# Patient Record
Sex: MALE | Race: BLACK OR AFRICAN AMERICAN | Employment: FULL TIME | URBAN - METROPOLITAN AREA
[De-identification: names, ages, dates, MRNs, and addresses within clinical notes are randomized per-mention and may not be internally consistent; named-entity substitution may affect disease eponyms.]

---

## 2020-02-25 ENCOUNTER — OFFICE VISIT (OUTPATIENT)
Dept: FAMILY MEDICINE | Facility: CLINIC | Age: 50
End: 2020-02-25
Payer: COMMERCIAL

## 2020-02-25 VITALS
TEMPERATURE: 98.1 F | HEIGHT: 67 IN | BODY MASS INDEX: 26.84 KG/M2 | DIASTOLIC BLOOD PRESSURE: 99 MMHG | HEART RATE: 97 BPM | SYSTOLIC BLOOD PRESSURE: 190 MMHG | WEIGHT: 171 LBS | OXYGEN SATURATION: 100 %

## 2020-02-25 DIAGNOSIS — Z13.1 SCREENING FOR DIABETES MELLITUS: ICD-10-CM

## 2020-02-25 DIAGNOSIS — G89.29 CHRONIC LEFT SHOULDER PAIN: ICD-10-CM

## 2020-02-25 DIAGNOSIS — R03.0 ELEVATED BLOOD PRESSURE READING WITHOUT DIAGNOSIS OF HYPERTENSION: Primary | ICD-10-CM

## 2020-02-25 DIAGNOSIS — Z13.220 SCREENING FOR HYPERLIPIDEMIA: ICD-10-CM

## 2020-02-25 DIAGNOSIS — M25.512 CHRONIC LEFT SHOULDER PAIN: ICD-10-CM

## 2020-02-25 DIAGNOSIS — I10 ESSENTIAL HYPERTENSION WITH GOAL BLOOD PRESSURE LESS THAN 130/80: ICD-10-CM

## 2020-02-25 PROCEDURE — 93000 ELECTROCARDIOGRAM COMPLETE: CPT | Performed by: FAMILY MEDICINE

## 2020-02-25 PROCEDURE — 99214 OFFICE O/P EST MOD 30 MIN: CPT | Performed by: FAMILY MEDICINE

## 2020-02-25 RX ORDER — HYDROCHLOROTHIAZIDE 25 MG/1
25 TABLET ORAL DAILY
Qty: 30 TABLET | Refills: 0 | Status: SHIPPED | OUTPATIENT
Start: 2020-02-25 | End: 2020-03-03

## 2020-02-25 ASSESSMENT — MIFFLIN-ST. JEOR: SCORE: 1601.34

## 2020-02-25 NOTE — PROGRESS NOTES
"Subjective     David Alaniz is a 49 year old male who presents to clinic today for the following health issues:    HPI   Joint Pain    Onset: Sept or Oct    Description:   Location: left shoulder  Character: Sharp and throbbing    Intensity: moderate    Progression of Symptoms: worse    Accompanying Signs & Symptoms:  Other symptoms: none    History:   Previous similar pain: no       Precipitating factors:   Trauma or overuse: YES- started after getting flu shot    Alleviating factors:  Improved by: nothing    Therapies Tried and outcome: tylenol    Denies any numbness, tingling, or weakness in extremities.    Hypertension: Of note, patient's blood pressure was 190/99 today in clinic.  He denies any headaches, lightheadedness, or dizziness.  No chest pain, dyspnea, shortness of breath.  He has noticed change in vision and has trouble with reading things close up now.  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  Typically states that his in the 140 systolic and between 80 and 100 diastolic.  Occasionally he will check it and it is in the 190s.        There is no problem list on file for this patient.    No past surgical history on file.    Social History     Tobacco Use     Smoking status: Never Smoker     Smokeless tobacco: Never Used   Substance Use Topics     Alcohol use: Yes     Comment: rarely     Family History   Problem Relation Age of Onset     Cerebrovascular Disease Mother          Reviewed and updated as needed this visit by Provider  Problems  Fam Hx         Review of Systems   ROS COMP: Constitutional, HEENT, cardiovascular, pulmonary, gi and gu systems are negative, except as otherwise noted.      Objective    BP (!) 190/99   Pulse 97   Temp 98.1  F (36.7  C) (Oral)   Ht 1.689 m (5' 6.5\")   Wt 77.6 kg (171 lb)   SpO2 100%   BMI 27.19 kg/m    Body mass index is 27.19 kg/m .  Physical Exam   GENERAL: healthy, alert and no distress  HENT: ear canals and TM's normal, " nose and mouth without ulcers or lesions  NECK: no adenopathy, no asymmetry, masses, or scars and thyroid normal to palpation  RESP: lungs clear to auscultation - no rales, rhonchi or wheezes  CV: regular rate and rhythm, normal S1 S2, no S3 or S4, no murmur, click or rub, no peripheral edema and peripheral pulses strong  MS: no gross musculoskeletal defects noted, no edema    Diagnostic Test Results:  Labs reviewed in Epic  EKG - negative        Assessment & Plan     1. Elevated blood pressure reading without diagnosis of hypertension  - EKG 12-lead complete w/read - Clinics    2. Essential hypertension with goal blood pressure less than 130/80: Start hydrochlorothiazide 25 mg daily.  We will have him follow-up in 1 week to recheck blood pressure with nurse.  We will add additional medication if needed.  At that time we will also check routine fasting labs.  Encouraged following low-sodium diet, increasing exercise and managing weight.  - hydrochlorothiazide (HYDRODIURIL) 25 MG tablet; Take 1 tablet (25 mg) by mouth daily  Dispense: 30 tablet; Refill: 0  - RN HTN MGMT  - **Basic metabolic panel FUTURE anytime; Future    3. Chronic left shoulder pain:   - [facility] REFERRAL; Future  - SPORTS MEDICINE REFERRAL    4. Screening for diabetes mellitus  - **Basic metabolic panel FUTURE anytime; Future    5. Screening for hyperlipidemia  - Lipid panel reflex to direct LDL Fasting; Future       Return in about 1 week (around 3/3/2020) for BP Recheck with RN.    Felix Daley, [facility]

## 2020-03-03 ENCOUNTER — ALLIED HEALTH/NURSE VISIT (OUTPATIENT)
Dept: NURSING | Facility: CLINIC | Age: 50
End: 2020-03-03
Payer: COMMERCIAL

## 2020-03-03 VITALS — HEART RATE: 75 BPM | OXYGEN SATURATION: 100 % | DIASTOLIC BLOOD PRESSURE: 94 MMHG | SYSTOLIC BLOOD PRESSURE: 162 MMHG

## 2020-03-03 DIAGNOSIS — Z53.9 ERRONEOUS ENCOUNTER--DISREGARD: Primary | ICD-10-CM

## 2020-03-03 DIAGNOSIS — Z13.220 SCREENING FOR HYPERLIPIDEMIA: ICD-10-CM

## 2020-03-03 DIAGNOSIS — I10 ESSENTIAL HYPERTENSION WITH GOAL BLOOD PRESSURE LESS THAN 130/80: Primary | ICD-10-CM

## 2020-03-03 DIAGNOSIS — Z13.1 SCREENING FOR DIABETES MELLITUS: ICD-10-CM

## 2020-03-03 PROCEDURE — 80048 BASIC METABOLIC PNL TOTAL CA: CPT | Performed by: FAMILY MEDICINE

## 2020-03-03 PROCEDURE — 36415 COLL VENOUS BLD VENIPUNCTURE: CPT | Performed by: FAMILY MEDICINE

## 2020-03-03 PROCEDURE — 80061 LIPID PANEL: CPT | Performed by: FAMILY MEDICINE

## 2020-03-03 RX ORDER — AMLODIPINE BESYLATE 5 MG/1
5 TABLET ORAL DAILY
Qty: 30 TABLET | Refills: 0 | Status: SHIPPED | OUTPATIENT
Start: 2020-03-03 | End: 2020-03-17

## 2020-03-03 RX ORDER — HYDROCHLOROTHIAZIDE 25 MG/1
25 TABLET ORAL DAILY
Qty: 90 TABLET | Refills: 0 | Status: SHIPPED | OUTPATIENT
Start: 2020-03-03 | End: 2020-03-17

## 2020-03-03 NOTE — PROGRESS NOTES
David Alaniz is being followed for Blood Pressure management.    NURSING ASSESSMENT/PLAN:  Blood pressure reading today is not at the provider specified goal of <130/80.    Current blood pressure medication(s):  Hydrochlorothiazide 25mg daily  1.  The patient will begin: amlodipine 5mg daily.     2.  We will be checking a metabolic lab panel today.      3.  Follow up instructions include:     Next Nurse visit: 2 weeks.    SUBJECTIVE:                                                    The patient is taking medication as prescribed and is tolerating well.   Patient is not monitoring Blood Pressure at home.     Out of the following complicating factors: Cough, Headache, Lightheadedness, Shortness of breath, Fatigue, Nausea, Sexual Dysfunction, New onset of swelling or edema, Weakness and New onset of Chest Pain, the patient reports:  None    OBJECTIVE:                                                    Is pulse 55 or greater? - Yes  Pulse Readings from Last 1 Encounters:   03/03/20 75     Today's BP completed using cuff size: regular on left arm.  BP Readings from Last 3 Encounters:   03/03/20 (!) 162/94   02/25/20 (!) 190/99       Current Outpatient Medications   Medication Sig Dispense Refill     amLODIPine (NORVASC) 5 MG tablet Take 1 tablet (5 mg) by mouth daily 30 tablet 0     hydrochlorothiazide (HYDRODIURIL) 25 MG tablet Take 1 tablet (25 mg) by mouth daily 90 tablet 0     No results found for: POTASSIUM  No results found for: CR  No results found for: BUN  No results found for: GFRESTIMATED   A baseline potassium, creatinine, BUN, GFR has not been done within past 12 months - completing today    Education:  general discussion/verbal explanation  These interventions were used: Permission to raise concern or advise and Open ended questions  Patient was given an opportunity to ask questions.    Patient verbalized understanding of this plan and is agreeable.    Copy of current RN HTN MGMT order or refer to Other  Orders    Dosage adjustment made based on patient specific, physician directed, care plan.  Carole Adams, CAROLYNEN, RN

## 2020-03-03 NOTE — PATIENT INSTRUCTIONS
Continue hydrochlorothiazide 25mg daily and add amlodipine 5mg daily. Recheck blood pressure with nurse in 2 weeks.

## 2020-03-04 ENCOUNTER — OFFICE VISIT (OUTPATIENT)
Dept: ORTHOPEDICS | Facility: CLINIC | Age: 50
End: 2020-03-04
Payer: COMMERCIAL

## 2020-03-04 VITALS
BODY MASS INDEX: 26.84 KG/M2 | WEIGHT: 171 LBS | DIASTOLIC BLOOD PRESSURE: 104 MMHG | HEIGHT: 67 IN | SYSTOLIC BLOOD PRESSURE: 166 MMHG

## 2020-03-04 DIAGNOSIS — E78.5 HYPERLIPIDEMIA LDL GOAL <100: Primary | ICD-10-CM

## 2020-03-04 DIAGNOSIS — M75.42 IMPINGEMENT SYNDROME OF LEFT SHOULDER: ICD-10-CM

## 2020-03-04 DIAGNOSIS — M75.102 ROTATOR CUFF SYNDROME OF LEFT SHOULDER: Primary | ICD-10-CM

## 2020-03-04 PROBLEM — I10 HYPERTENSION GOAL BP (BLOOD PRESSURE) < 140/90: Status: ACTIVE | Noted: 2020-03-04

## 2020-03-04 LAB
ANION GAP SERPL CALCULATED.3IONS-SCNC: 6 MMOL/L (ref 3–14)
BUN SERPL-MCNC: 12 MG/DL (ref 7–30)
CALCIUM SERPL-MCNC: 9.8 MG/DL (ref 8.5–10.1)
CHLORIDE SERPL-SCNC: 102 MMOL/L (ref 94–109)
CHOLEST SERPL-MCNC: 377 MG/DL
CO2 SERPL-SCNC: 28 MMOL/L (ref 20–32)
CREAT SERPL-MCNC: 1.27 MG/DL (ref 0.66–1.25)
GFR SERPL CREATININE-BSD FRML MDRD: 67 ML/MIN/{1.73_M2}
GLUCOSE SERPL-MCNC: 132 MG/DL (ref 70–99)
HDLC SERPL-MCNC: 45 MG/DL
LDLC SERPL CALC-MCNC: 294 MG/DL
NONHDLC SERPL-MCNC: 332 MG/DL
POTASSIUM SERPL-SCNC: 3.4 MMOL/L (ref 3.4–5.3)
SODIUM SERPL-SCNC: 136 MMOL/L (ref 133–144)
TRIGL SERPL-MCNC: 188 MG/DL

## 2020-03-04 PROCEDURE — 99204 OFFICE O/P NEW MOD 45 MIN: CPT | Performed by: FAMILY MEDICINE

## 2020-03-04 RX ORDER — ROSUVASTATIN CALCIUM 20 MG/1
20 TABLET, COATED ORAL DAILY
Qty: 90 TABLET | Refills: 3 | Status: SHIPPED | OUTPATIENT
Start: 2020-03-04 | End: 2021-02-25

## 2020-03-04 RX ORDER — CYCLOBENZAPRINE HCL 5 MG
5 TABLET ORAL
Qty: 40 TABLET | Refills: 0 | Status: SHIPPED | OUTPATIENT
Start: 2020-03-04 | End: 2020-04-13

## 2020-03-04 ASSESSMENT — MIFFLIN-ST. JEOR: SCORE: 1601.34

## 2020-03-04 NOTE — RESULT ENCOUNTER NOTE
Mr. Alaniz,    -LDL(bad) cholesterol level is elevated, and your triglycerides are elevated which can increase your heart disease risk.  A diet high in fat and simple carbohydrates, genetics and being overweight can contribute to this. ADVISE: exercising 150 minutes of aerobic exercise per week (30 minutes for 5 days per week or 50 minutes for 3 days per week are options), eating a low saturated fat/low carbohydrate diet, and omega-3 fatty acids (fish oil) 9745-5370 mg daily are helpful to improve this. Current guidelines from the American Heart Association support starting a cholesterol lowering medication to lower your heart and stroke disease risk.  I am sending a prescription to your pharmacy: rosuvastatin(Crestor) 20 mg each evening.  You can call your pharmacy to let them know you would like your prescription filled and if you have concerns about this recommendation then please contact Dr. Daley.  Please make an appointment to see him in 6 weeks and come in fasting.  -Kidney function (GFR) is normal.  -Sodium is normal.  -Potassium is normal.  -Calcium is normal.  -Glucose is slight elevated and may be a sign of early diabetes (prediabetes)? ADVISE:: eating a low carbohydrate diet, exercising, trying to lose weight (if necessary) and rechecking your glucose level in 3 months.    If you have further questions about the interpretation of your labs, labtestsonline.org is a good website to check out for further information.    Please contact the clinic if you have additional questions.  Thank you.    Sincerely,    Clive Smalls MD    
Yes

## 2020-03-04 NOTE — LETTER
3/4/2020         RE: David Alaniz  [address] [address]        Dear Colleague,    Thank you for referring your patient, David Alaniz, to the [facility] SPORTS [facility]. Please see a copy of my visit note below.    ASSESSMENT & PLAN  Patient Instructions     1. Rotator cuff syndrome of left shoulder    2. Impingement syndrome of left shoulder      -Patient has left shoulder pain due to tendinitis of the rotator cuff muscles and bursitis  -Patient will start formal physical therapy and home exercise program  -Patient has recently diagnosed uncontrolled high blood pressure and started on multiple blood pressure medications and so we will avoid oral anti-inflammatory medications as they may interfere with that treatment.  Patient will continue with 2 tablets of extra Tylenol 2-3 times a day as needed for pain.  -Patient reports intermittent pain at night and so we will try Flexeril 5 mg at bedtime as needed.  He may increase to 10 mg at bedtime if he does not develop morning drowsiness.  -Patient will follow-up in 4 weeks for reevaluation and progression of activity.  If no improvement, to consider a subacromial cortisone injection at that time.  -Patient may continue to work as tolerated.  Patient was taught modifications on how to work with patients to minimize aggravation of shoulder pain  -Call direct clinic number [[phone]] at any time with questions or concerns.    Albert Yeo MD [facility] Orthopedics and Sports Medicine  [facility]          -----    SUBJECTIVE  David Alaniz is a/an 49 year old Right handed male who is seen in consultation at the request of  Felix Daley M.D. for evaluation of left shoulder pain. The patient is seen by themselves.    Onset: 6 month(s) ago. Symptoms following flu shot in Fall 2019.  Location of Pain: left shoulder, radiating to elbow  Rating of Pain at worst: 7/10  Rating of Pain Currently: 5/10  Worsened by: overhead motion,  lifting, stretching  Better with: heat and Tylenol  Treatments tried: rest/activity avoidance, heat and Tylenol  Associated symptoms: no distal numbness or tingling; denies swelling or warmth and sharp, throbbing, stabbing pain  Orthopedic history: NO  Relevant surgical history: NO  Social history: social history: works as nurse at [employer]    History reviewed. No pertinent past medical history.  Social History     Socioeconomic History     Marital status:      Spouse name: Not on file     Number of children: Not on file     Years of education: Not on file     Highest education level: Not on file   Occupational History     Not on file   Social Needs     Financial resource strain: Not on file     Food insecurity:     Worry: Not on file     Inability: Not on file     Transportation needs:     Medical: Not on file     Non-medical: Not on file   Tobacco Use     Smoking status: Never Smoker     Smokeless tobacco: Never Used   Substance and Sexual Activity     Alcohol use: Yes     Comment: rarely     Drug use: Never     Sexual activity: Yes   Lifestyle     Physical activity:     Days per week: Not on file     Minutes per session: Not on file     Stress: Not on file   Relationships     Social connections:     Talks on phone: Not on file     Gets together: Not on file     Attends Mormon service: Not on file     Active member of club or organization: Not on file     Attends meetings of clubs or organizations: Not on file     Relationship status: Not on file     Intimate partner violence:     Fear of current or ex partner: Not on file     Emotionally abused: Not on file     Physically abused: Not on file     Forced sexual activity: Not on file   Other Topics Concern     Not on file   Social History Narrative     Not on file         Patient's past medical, surgical, social, and family histories were reviewed today and no changes are noted.    REVIEW OF SYSTEMS:  10 point ROS is negative other than symptoms  "noted above in HPI, Past Medical History or as stated below  Constitutional: NEGATIVE for fever, chills, change in weight  Skin: NEGATIVE for worrisome rashes, moles or lesions  GI/: NEGATIVE for bowel or bladder changes  Neuro: NEGATIVE for weakness, dizziness or paresthesias    OBJECTIVE:  BP (!) 166/104   Ht 1.689 m (5' 6.5\")   Wt 77.6 kg (171 lb)   BMI 27.19 kg/m      General: healthy, alert and in no distress  HEENT: no scleral icterus or conjunctival erythema  Skin: no suspicious lesions or rash. No jaundice.  CV: regular rhythm by palpation  Resp: normal respiratory effort without conversational dyspnea   Psych: normal mood and affect  Gait: normal steady gait with appropriate coordination and balance  Neuro: normal light touch sensory exam of the bilateral upper extremities.    MSK:  LEFT SHOULDER  Inspection:    no swelling, bruising, discoloration, or obvious deformity or asymmetry  Palpation:    bony and tendinous landmarks are nontender.  Active Range of Motion:     Abduction normal0, FF normal0, ER normal0, IR normal.      Scapular dyskinesis absent  Strength:    Scapular plane abduction 5-/5,  ER 5/5, IR 5/5, biceps 5/5, triceps 5/5  Special Tests:    Positive: Bernal' and supraspinatus (empty can)    Negative: Neer's, drop arm/painful arc, crossed arm adduction, Alfalfa's, Speed's and Yergason's        Independent visualization of the below image:  No results found for this or any previous visit (from the past 24 hour(s)).        Albert Yeo MD [facility] Sports and Orthopedic Care      Again, thank you for allowing me to participate in the care of your patient.        Sincerely,        Albert Yeo, MD    "

## 2020-03-04 NOTE — PROGRESS NOTES
ASSESSMENT & PLAN  Patient Instructions     1. Rotator cuff syndrome of left shoulder    2. Impingement syndrome of left shoulder      -Patient has left shoulder pain due to tendinitis of the rotator cuff muscles and bursitis  -Patient will start formal physical therapy and home exercise program  -Patient has recently diagnosed uncontrolled high blood pressure and started on multiple blood pressure medications and so we will avoid oral anti-inflammatory medications as they may interfere with that treatment.  Patient will continue with 2 tablets of extra Tylenol 2-3 times a day as needed for pain.  -Patient reports intermittent pain at night and so we will try Flexeril 5 mg at bedtime as needed.  He may increase to 10 mg at bedtime if he does not develop morning drowsiness.  -Patient will follow-up in 4 weeks for reevaluation and progression of activity.  If no improvement, to consider a subacromial cortisone injection at that time.  -Patient may continue to work as tolerated.  Patient was taught modifications on how to work with patients to minimize aggravation of shoulder pain  -Call direct clinic number [[phone]] at any time with questions or concerns.    Albert Yeo MD [facility] Orthopedics and Sports Medicine  [facility]          -----    SUBJECTIVE  David Alaniz is a/an 49 year old Right handed male who is seen in consultation at the request of  Felix Daley M.D. for evaluation of left shoulder pain. The patient is seen by themselves.    Onset: 6 month(s) ago. Symptoms following flu shot in Fall 2019.  Location of Pain: left shoulder, radiating to elbow  Rating of Pain at worst: 7/10  Rating of Pain Currently: 5/10  Worsened by: overhead motion, lifting, stretching  Better with: heat and Tylenol  Treatments tried: rest/activity avoidance, heat and Tylenol  Associated symptoms: no distal numbness or tingling; denies swelling or warmth and sharp, throbbing, stabbing pain  Orthopedic  history: NO  Relevant surgical history: NO  Social history: social history: works as nurse at [employer]    History reviewed. No pertinent past medical history.  Social History     Socioeconomic History     Marital status:      Spouse name: Not on file     Number of children: Not on file     Years of education: Not on file     Highest education level: Not on file   Occupational History     Not on file   Social Needs     Financial resource strain: Not on file     Food insecurity:     Worry: Not on file     Inability: Not on file     Transportation needs:     Medical: Not on file     Non-medical: Not on file   Tobacco Use     Smoking status: Never Smoker     Smokeless tobacco: Never Used   Substance and Sexual Activity     Alcohol use: Yes     Comment: rarely     Drug use: Never     Sexual activity: Yes   Lifestyle     Physical activity:     Days per week: Not on file     Minutes per session: Not on file     Stress: Not on file   Relationships     Social connections:     Talks on phone: Not on file     Gets together: Not on file     Attends Alevism service: Not on file     Active member of club or organization: Not on file     Attends meetings of clubs or organizations: Not on file     Relationship status: Not on file     Intimate partner violence:     Fear of current or ex partner: Not on file     Emotionally abused: Not on file     Physically abused: Not on file     Forced sexual activity: Not on file   Other Topics Concern     Not on file   Social History Narrative     Not on file         Patient's past medical, surgical, social, and family histories were reviewed today and no changes are noted.    REVIEW OF SYSTEMS:  10 point ROS is negative other than symptoms noted above in HPI, Past Medical History or as stated below  Constitutional: NEGATIVE for fever, chills, change in weight  Skin: NEGATIVE for worrisome rashes, moles or lesions  GI/: NEGATIVE for bowel or bladder changes  Neuro: NEGATIVE  "for weakness, dizziness or paresthesias    OBJECTIVE:  BP (!) 166/104   Ht 1.689 m (5' 6.5\")   Wt 77.6 kg (171 lb)   BMI 27.19 kg/m     General: healthy, alert and in no distress  HEENT: no scleral icterus or conjunctival erythema  Skin: no suspicious lesions or rash. No jaundice.  CV: regular rhythm by palpation  Resp: normal respiratory effort without conversational dyspnea   Psych: normal mood and affect  Gait: normal steady gait with appropriate coordination and balance  Neuro: normal light touch sensory exam of the bilateral upper extremities.    MSK:  LEFT SHOULDER  Inspection:    no swelling, bruising, discoloration, or obvious deformity or asymmetry  Palpation:    bony and tendinous landmarks are nontender.  Active Range of Motion:     Abduction normal0, FF normal0, ER normal0, IR normal.      Scapular dyskinesis absent  Strength:    Scapular plane abduction 5-/5,  ER 5/5, IR 5/5, biceps 5/5, triceps 5/5  Special Tests:    Positive: Bernal' and supraspinatus (empty can)    Negative: Neer's, drop arm/painful arc, crossed arm adduction, Calvert's, Speed's and Yergason's        Independent visualization of the below image:  No results found for this or any previous visit (from the past 24 hour(s)).        Albert Yeo MD [facility] Sports and Orthopedic Care    "

## 2020-03-04 NOTE — PATIENT INSTRUCTIONS
1. Rotator cuff syndrome of left shoulder    2. Impingement syndrome of left shoulder      -Patient has left shoulder pain due to tendinitis of the rotator cuff muscles and bursitis  -Patient will start formal physical therapy and home exercise program  -Patient has recently diagnosed uncontrolled high blood pressure and started on multiple blood pressure medications and so we will avoid oral anti-inflammatory medications as they may interfere with that treatment.  Patient will continue with 2 tablets of extra Tylenol 2-3 times a day as needed for pain.  -Patient reports intermittent pain at night and so we will try Flexeril 5 mg at bedtime as needed.  He may increase to 10 mg at bedtime if he does not develop morning drowsiness.  -Patient will follow-up in 4 weeks for reevaluation and progression of activity.  If no improvement, to consider a subacromial cortisone injection at that time.  -Patient may continue to work as tolerated.  Patient was taught modifications on how to work with patients to minimize aggravation of shoulder pain  -Call direct clinic number [[phone]] at any time with questions or concerns.    Albert Yeo MD CAQSM  [place] [facility]  [facility] [place]

## 2020-03-12 ENCOUNTER — HEALTH MAINTENANCE LETTER (OUTPATIENT)
Age: 50
End: 2020-03-12

## 2020-03-17 ENCOUNTER — ALLIED HEALTH/NURSE VISIT (OUTPATIENT)
Dept: NURSING | Facility: CLINIC | Age: 50
End: 2020-03-17
Payer: COMMERCIAL

## 2020-03-17 VITALS — OXYGEN SATURATION: 100 % | SYSTOLIC BLOOD PRESSURE: 130 MMHG | HEART RATE: 80 BPM | DIASTOLIC BLOOD PRESSURE: 78 MMHG

## 2020-03-17 DIAGNOSIS — I10 HYPERTENSION GOAL BP (BLOOD PRESSURE) < 140/90: Primary | ICD-10-CM

## 2020-03-17 DIAGNOSIS — I10 ESSENTIAL HYPERTENSION WITH GOAL BLOOD PRESSURE LESS THAN 130/80: ICD-10-CM

## 2020-03-17 PROCEDURE — 99207 ZZC NO CHARGE NURSE ONLY: CPT

## 2020-03-17 RX ORDER — HYDROCHLOROTHIAZIDE 25 MG/1
25 TABLET ORAL DAILY
Qty: 90 TABLET | Refills: 1 | Status: SHIPPED | OUTPATIENT
Start: 2020-03-17 | End: 2020-09-04

## 2020-03-17 RX ORDER — AMLODIPINE BESYLATE 5 MG/1
5 TABLET ORAL DAILY
Qty: 90 TABLET | Refills: 1 | Status: SHIPPED | OUTPATIENT
Start: 2020-03-17 | End: 2021-02-25

## 2020-03-17 NOTE — PROGRESS NOTES
David Alaniz is being followed for Blood Pressure management.    NURSING ASSESSMENT/PLAN:  Blood pressure reading today is at the provider specified goal of <140/90.    Current blood pressure medication(s):  Hydrochlorothiazide 25mg daily, amlodipine 5mg daily  1.  The patient will continue current medication regimen unchanged.     2.  We will not be checking a metabolic lab panel today.      3.  Follow up instructions include:     Next Provider visit: Follow up in 5 months.    SUBJECTIVE:                                                    The patient is taking medication as prescribed and is tolerating well.   Patient is not monitoring Blood Pressure at home.     In addition notes: Patient reports that he feels his appetite has increased with new medication (amlodipine)    Out of the following complicating factors: Cough, Headache, Lightheadedness, Shortness of breath, Fatigue, Nausea, Sexual Dysfunction, New onset of swelling or edema, Weakness and New onset of Chest Pain, the patient reports:  None    OBJECTIVE:                                                    Is pulse 55 or greater? - Yes  Pulse Readings from Last 1 Encounters:   03/17/20 80     Today's BP completed using cuff size: regular on left side  arm.  BP Readings from Last 3 Encounters:   03/17/20 130/78   03/04/20 (!) 166/104   03/03/20 (!) 162/94       Current Outpatient Medications   Medication Sig Dispense Refill     amLODIPine (NORVASC) 5 MG tablet Take 1 tablet (5 mg) by mouth daily 90 tablet 1     hydrochlorothiazide (HYDRODIURIL) 25 MG tablet Take 1 tablet (25 mg) by mouth daily 90 tablet 1     cyclobenzaprine (FLEXERIL) 5 MG tablet Take 1 tablet (5 mg) by mouth nightly as needed for muscle spasms (1-2 tablets at bedtime as needed) 40 tablet 0     rosuvastatin (CRESTOR) 20 MG tablet Take 1 tablet (20 mg) by mouth daily 90 tablet 3     Potassium   Date Value Ref Range Status   03/03/2020 3.4 3.4 - 5.3 mmol/L Final     Creatinine   Date Value Ref  Range Status   03/03/2020 1.27 (H) 0.66 - 1.25 mg/dL Final     Urea Nitrogen   Date Value Ref Range Status   03/03/2020 12 7 - 30 mg/dL Final     GFR Estimate   Date Value Ref Range Status   03/03/2020 67 >60 mL/min/[1.73_m2] Final     Comment:     Non  GFR Calc  Starting 12/18/2018, serum creatinine based estimated GFR (eGFR) will be   calculated using the Chronic Kidney Disease Epidemiology Collaboration   (CKD-EPI) equation.        A baseline potassium, creatinine, BUN, GFR has been done within past 12 months    Education:  general discussion/verbal explanation  These interventions were used: Collaboration, Evocation, Support autonomy, Permission to raise concern or advise and Open ended questions  Patient was given an opportunity to ask questions.    Patient verbalized understanding of this plan and is agreeable.    Copy of current RN HTN MGMT order or refer to Other Orders    MARTHA Saxena, RN

## 2020-04-13 DIAGNOSIS — M75.102 ROTATOR CUFF SYNDROME OF LEFT SHOULDER: ICD-10-CM

## 2020-04-13 RX ORDER — CYCLOBENZAPRINE HCL 5 MG
5 TABLET ORAL
Qty: 40 TABLET | Refills: 0 | Status: SHIPPED | OUTPATIENT
Start: 2020-04-13 | End: 2021-07-01

## 2020-05-18 ENCOUNTER — TELEPHONE (OUTPATIENT)
Dept: FAMILY MEDICINE | Facility: CLINIC | Age: 50
End: 2020-05-18

## 2020-05-18 NOTE — TELEPHONE ENCOUNTER
See message from patient below. Per chart, patient works for [employer] as an RN. He should contact his [employer] to inquire about testing. [facility] message sent to patient.  MARTHA Saxena, RN  [facility]

## 2020-05-21 ENCOUNTER — OFFICE VISIT (OUTPATIENT)
Dept: URGENT CARE | Facility: URGENT CARE | Age: 50
End: 2020-05-21
Attending: PHYSICIAN ASSISTANT
Payer: COMMERCIAL

## 2020-05-21 DIAGNOSIS — Z20.822 EXPOSURE TO COVID-19 VIRUS: ICD-10-CM

## 2020-05-21 DIAGNOSIS — J02.9 PHARYNGITIS, UNSPECIFIED ETIOLOGY: ICD-10-CM

## 2020-05-21 DIAGNOSIS — R43.0 ANOSMIA: ICD-10-CM

## 2020-05-21 PROCEDURE — 99207 ZZC NO BILLABLE SERVICE THIS VISIT: CPT

## 2020-05-21 PROCEDURE — 99000 SPECIMEN HANDLING OFFICE-LAB: CPT | Performed by: PHYSICIAN ASSISTANT

## 2020-05-21 PROCEDURE — 87635 SARS-COV-2 COVID-19 AMP PRB: CPT | Mod: 90 | Performed by: PHYSICIAN ASSISTANT

## 2020-05-23 LAB
SARS-COV-2 RNA SPEC QL NAA+PROBE: NOT DETECTED
SPECIMEN SOURCE: NORMAL

## 2020-09-02 DIAGNOSIS — I10 ESSENTIAL HYPERTENSION WITH GOAL BLOOD PRESSURE LESS THAN 130/80: ICD-10-CM

## 2020-09-02 NOTE — TELEPHONE ENCOUNTER
Routing refill request to provider for review/approval because:  Labs out of range:  Cr 1.27 on 3/3/20    Arina JEFFERS RN  EP Triage

## 2020-09-04 RX ORDER — HYDROCHLOROTHIAZIDE 25 MG/1
TABLET ORAL
Qty: 90 TABLET | Refills: 1 | Status: SHIPPED | OUTPATIENT
Start: 2020-09-04 | End: 2021-02-19

## 2020-10-26 DIAGNOSIS — M75.102 ROTATOR CUFF SYNDROME OF LEFT SHOULDER: ICD-10-CM

## 2020-10-27 RX ORDER — CYCLOBENZAPRINE HCL 5 MG
5 TABLET ORAL
Qty: 40 TABLET | Refills: 0 | OUTPATIENT
Start: 2020-10-27

## 2021-01-04 ENCOUNTER — HEALTH MAINTENANCE LETTER (OUTPATIENT)
Age: 51
End: 2021-01-04

## 2021-02-18 DIAGNOSIS — E78.5 HYPERLIPIDEMIA LDL GOAL <100: ICD-10-CM

## 2021-02-18 DIAGNOSIS — I10 ESSENTIAL HYPERTENSION WITH GOAL BLOOD PRESSURE LESS THAN 130/80: ICD-10-CM

## 2021-02-19 NOTE — TELEPHONE ENCOUNTER
LOV: 2/25/20  Patient due for physical  No future appt scheduled    Routing to [facility] to assist in scheduling    Ely Rossi RN, BSN  [facility]

## 2021-02-19 NOTE — TELEPHONE ENCOUNTER
LOV: 2/25/20  Patient due for physical  No future appt scheduled    Routing to [facility] to assist in scheduling    Ely Rossi RN, BSN  [facility]

## 2021-02-25 RX ORDER — HYDROCHLOROTHIAZIDE 25 MG/1
TABLET ORAL
Qty: 90 TABLET | Refills: 0 | Status: SHIPPED | OUTPATIENT
Start: 2021-02-25 | End: 2021-07-20

## 2021-02-25 RX ORDER — ROSUVASTATIN CALCIUM 20 MG/1
TABLET, COATED ORAL
Qty: 90 TABLET | Refills: 0 | Status: SHIPPED | OUTPATIENT
Start: 2021-02-25 | End: 2021-04-06

## 2021-02-25 NOTE — TELEPHONE ENCOUNTER
Name: David Alaniz MRN: [id number]     Date: 3/2/2021 Status: Scheduled     Time: 9:30 AM Length: 30     Visit Type: PHYSICAL [122] Copay: $0.00     Provider: Felix Daley DO Department:  FAMILY PRACTICE     Bill Area: Family Medicine      Encounter #: [id number] Notes: physical no previous listed

## 2021-02-25 NOTE — TELEPHONE ENCOUNTER
Medication is being filled for 1 time refill only due to:  Patient needs to be seen because it has been more than one year since last visit.    Olya Noguera RN  [facility]

## 2021-02-25 NOTE — TELEPHONE ENCOUNTER
Medication is being filled for 1 time refill only due to:  Patient needs to be seen because it has been more than one year since last visit.    Olya Noguera RN  [facility]

## 2021-02-25 NOTE — TELEPHONE ENCOUNTER
Name: David Alaniz MRN: [id number]     Date: 3/2/2021 Status: Scheduled     Time: 9:30 AM Length: 30     Visit Type: PHYSICAL [122] Copay: $0.00     Provider: Felix Daley DO Department:  FAMILY PRACTICE     Bill Area: Family Medicine      Encounter #: [id number] Notes: physical no previous listed

## 2021-03-02 ENCOUNTER — OFFICE VISIT (OUTPATIENT)
Dept: FAMILY MEDICINE | Facility: CLINIC | Age: 51
End: 2021-03-02
Payer: COMMERCIAL

## 2021-03-02 VITALS
SYSTOLIC BLOOD PRESSURE: 164 MMHG | HEIGHT: 67 IN | DIASTOLIC BLOOD PRESSURE: 96 MMHG | BODY MASS INDEX: 28.09 KG/M2 | HEART RATE: 83 BPM | OXYGEN SATURATION: 99 % | TEMPERATURE: 97.9 F | WEIGHT: 179 LBS

## 2021-03-02 DIAGNOSIS — E78.5 HYPERLIPIDEMIA LDL GOAL <100: ICD-10-CM

## 2021-03-02 DIAGNOSIS — Z13.1 SCREENING FOR DIABETES MELLITUS: ICD-10-CM

## 2021-03-02 DIAGNOSIS — R45.86 MOOD CHANGES: ICD-10-CM

## 2021-03-02 DIAGNOSIS — R68.82 LOW LIBIDO: ICD-10-CM

## 2021-03-02 DIAGNOSIS — Z00.00 ROUTINE GENERAL MEDICAL EXAMINATION AT A HEALTH CARE FACILITY: Primary | ICD-10-CM

## 2021-03-02 DIAGNOSIS — Z13.21 ENCOUNTER FOR VITAMIN DEFICIENCY SCREENING: ICD-10-CM

## 2021-03-02 DIAGNOSIS — I10 ESSENTIAL HYPERTENSION WITH GOAL BLOOD PRESSURE LESS THAN 130/80: ICD-10-CM

## 2021-03-02 DIAGNOSIS — Z23 NEED FOR TDAP VACCINATION: ICD-10-CM

## 2021-03-02 LAB — DEPRECATED CALCIDIOL+CALCIFEROL SERPL-MC: 24 UG/L (ref 20–75)

## 2021-03-02 PROCEDURE — 80053 COMPREHEN METABOLIC PANEL: CPT | Performed by: FAMILY MEDICINE

## 2021-03-02 PROCEDURE — 84443 ASSAY THYROID STIM HORMONE: CPT | Performed by: FAMILY MEDICINE

## 2021-03-02 PROCEDURE — 90471 IMMUNIZATION ADMIN: CPT | Performed by: FAMILY MEDICINE

## 2021-03-02 PROCEDURE — 82550 ASSAY OF CK (CPK): CPT | Performed by: FAMILY MEDICINE

## 2021-03-02 PROCEDURE — 82306 VITAMIN D 25 HYDROXY: CPT | Performed by: FAMILY MEDICINE

## 2021-03-02 PROCEDURE — 84403 ASSAY OF TOTAL TESTOSTERONE: CPT | Mod: 90 | Performed by: FAMILY MEDICINE

## 2021-03-02 PROCEDURE — 99000 SPECIMEN HANDLING OFFICE-LAB: CPT | Performed by: FAMILY MEDICINE

## 2021-03-02 PROCEDURE — 90715 TDAP VACCINE 7 YRS/> IM: CPT | Performed by: FAMILY MEDICINE

## 2021-03-02 PROCEDURE — 36415 COLL VENOUS BLD VENIPUNCTURE: CPT | Performed by: FAMILY MEDICINE

## 2021-03-02 PROCEDURE — 99214 OFFICE O/P EST MOD 30 MIN: CPT | Mod: 25 | Performed by: FAMILY MEDICINE

## 2021-03-02 PROCEDURE — 99396 PREV VISIT EST AGE 40-64: CPT | Mod: 25 | Performed by: FAMILY MEDICINE

## 2021-03-02 PROCEDURE — 80061 LIPID PANEL: CPT | Performed by: FAMILY MEDICINE

## 2021-03-02 RX ORDER — AMLODIPINE BESYLATE 5 MG/1
10 TABLET ORAL DAILY
Qty: 30 TABLET | Refills: 0 | Status: SHIPPED | OUTPATIENT
Start: 2021-03-02 | End: 2021-03-24

## 2021-03-02 ASSESSMENT — PATIENT HEALTH QUESTIONNAIRE - PHQ9
5. POOR APPETITE OR OVEREATING: NEARLY EVERY DAY
SUM OF ALL RESPONSES TO PHQ QUESTIONS 1-9: 15

## 2021-03-02 ASSESSMENT — MIFFLIN-ST. JEOR: SCORE: 1632.63

## 2021-03-02 ASSESSMENT — ANXIETY QUESTIONNAIRES
GAD7 TOTAL SCORE: 7
5. BEING SO RESTLESS THAT IT IS HARD TO SIT STILL: SEVERAL DAYS
1. FEELING NERVOUS, ANXIOUS, OR ON EDGE: SEVERAL DAYS
6. BECOMING EASILY ANNOYED OR IRRITABLE: SEVERAL DAYS
2. NOT BEING ABLE TO STOP OR CONTROL WORRYING: SEVERAL DAYS
3. WORRYING TOO MUCH ABOUT DIFFERENT THINGS: NOT AT ALL
7. FEELING AFRAID AS IF SOMETHING AWFUL MIGHT HAPPEN: NOT AT ALL

## 2021-03-02 NOTE — PROGRESS NOTES
SUBJECTIVE:   CC: David Alaniz is an 50 year old male who presents for preventative health visit.       Patient has been advised of split billing requirements and indicates understanding: Yes  Healthy Habits:     Getting at least 3 servings of Calcium per day:  NO    Bi-annual eye exam:  Yes    Dental care twice a year:  Yes    Sleep apnea or symptoms of sleep apnea:  None    Diet:  Regular (no restrictions)    Frequency of exercise:  2-3 days/week    Duration of exercise:  15-30 minutes    Taking medications regularly:  Yes    Medication side effects:  Muscle aches    PHQ-2 Total Score: 2    Additional concerns today:  Yes      Hyperlipidemia Follow-Up      Are you regularly taking any medication or supplement to lower your cholesterol?   Yes- statin    Are you having muscle aches or other side effects that you think could be caused by your cholesterol lowering medication?  No    Hypertension Follow-up      Do you check your blood pressure regularly outside of the clinic? Yes     Are you following a low salt diet? No    Are your blood pressures ever more than 140 on the top number (systolic) OR more   than 90 on the bottom number (diastolic), for example 140/90? Yes     He has not taken BP medication today. Typically systolic pressures range between 130-150s/80-90s  BP Readings from Last 6 Encounters:   03/02/21 (!) 164/96   03/17/20 130/78   03/04/20 (!) 166/104   03/03/20 (!) 162/94   02/25/20 (!) 190/99     Denies any headaches, lightheadedness, dizziness, vision changes, chest pain, palpitations, shortness of breath, dyspnea, numbness/tingling.      Today's PHQ-2 Score:   PHQ-2 ( 1999 Pfizer) 2/26/2021   Q1: Little interest or pleasure in doing things 1   Q2: Feeling down, depressed or hopeless 1   PHQ-2 Score 2   Q1: Little interest or pleasure in doing things Several days   Q2: Feeling down, depressed or hopeless Several days   PHQ-2 Score 2   He feels like his mood has not been great. He doesn't feel like the  same he used to be. This has been going on for the past year. He denies any changes to his relationship with his wife or his work. 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     PHQ 3/2/2021   PHQ-9 Total Score 15   Q9: Thoughts of better off dead/self-harm past 2 weeks Several days     SHERMAN-7 SCORE 3/2/2021   Total Score 7         Abuse: Current or Past(Physical, Sexual or Emotional)- NO  Do you feel safe in your environment? YES    Have you ever done Advance Care Planning? (For example, a Health Directive, POLST, or a discussion with a medical provider or your loved ones about your wishes): No, advance care planning information given to patient to review.  Patient plans to discuss their wishes with loved ones or provider.      Social History     Tobacco Use     Smoking status: Never Smoker     Smokeless tobacco: Never Used   Substance Use Topics     Alcohol use: Yes     Comment: rarely     If you drink alcohol do you typically have >3 drinks per day or >7 drinks per week? No    Alcohol Use 3/2/2021   Prescreen: >3 drinks/day or >7 drinks/week? -   Prescreen: >3 drinks/day or >7 drinks/week? No       Last PSA: No results found for: PSA    Reviewed orders with patient. Reviewed health maintenance and updated orders accordingly - Yes  Lab work is in process    Reviewed and updated as needed this visit by clinical staff  Tobacco  Allergies  Meds   Med Hx  Surg Hx  Fam Hx  Soc Hx        Reviewed and updated as needed this visit by Provider  Tobacco               History reviewed. No pertinent past medical history.   History reviewed. No pertinent surgical history.    Review of Systems  CONSTITUTIONAL: NEGATIVE for fever, chills, change in weight  INTEGUMENTARY/SKIN: NEGATIVE for worrisome rashes, moles or lesions  EYES: NEGATIVE for vision changes or irritation  ENT: NEGATIVE for ear, mouth and throat problems  RESP: NEGATIVE for significant cough or SOB  CV: NEGATIVE for chest pain, palpitations or peripheral  "edema  GI: NEGATIVE for nausea, abdominal pain, heartburn, or change in bowel habits   male: negative for dysuria, hematuria, decreased urinary stream, erectile dysfunction, urethral discharge  MUSCULOSKELETAL: NEGATIVE for significant arthralgias or myalgia  NEURO: NEGATIVE for weakness, dizziness or paresthesias  PSYCHIATRIC: NEGATIVE for changes in mood or affect    OBJECTIVE:   BP (!) 164/96   Pulse 83   Temp 97.9  F (36.6  C) (Tympanic)   Ht 1.689 m (5' 6.5\")   Wt 81.2 kg (179 lb)   SpO2 99%   BMI 28.46 kg/m      Physical Exam  GENERAL: healthy, alert and no distress  EYES: Eyes grossly normal to inspection, PERRL and conjunctivae and sclerae normal  HENT: ear canals and TM's normal, nose and mouth without ulcers or lesions  NECK: no adenopathy and no asymmetry, masses, or scars  RESP: lungs clear to auscultation - no rales, rhonchi or wheezes  CV: regular rate and rhythm, normal S1 S2, no S3 or S4, no murmur, click or rub, no peripheral edema and peripheral pulses strong  ABDOMEN: soft, nontender, no hepatosplenomegaly, no masses and bowel sounds normal  MS: no gross musculoskeletal defects noted, no edema  SKIN: no suspicious lesions or rashes    Diagnostic Test Results:  Labs reviewed in Epic    ASSESSMENT/PLAN:   1. Routine general medical examination at a health care facility: health maintenance reviewed and updated.    2. Hyperlipidemia LDL goal <100: recheck labs, will check CK given muscle aches. Continue rosuvstatin for now.  - Comprehensive metabolic panel (BMP + Alb, Alk Phos, ALT, AST, Total. Bili, TP)  - Lipid panel reflex to direct LDL Fasting  - CK total    3. Screening for diabetes mellitus  - Comprehensive metabolic panel (BMP + Alb, Alk Phos, ALT, AST, Total. Bili, TP)    5. Need for Tdap vaccination  - TDAP VACCINE (Adacel, Boostrix)  [[id number]]    6. Essential hypertension with goal blood pressure less than 130/80: BP above goal, will increase amlodipine to 10 mg daily. Follow up in 1 " "month to review blood pressure. If still elevated, may need to add third agent.  - amLODIPine (NORVASC) 5 MG tablet; Take 2 tablets (10 mg) by mouth daily  Dispense: 30 tablet; Refill: 0    7. Mood changes: meets criteria for major depressive episode. Discussed treatment options including medication and therapy. Will check vitamin D, testosterone, , thyroid given low libido. Encourage regular exercise, balanced diet. He is open to possibly trying medication in the future. Wellbutrin might be a good option for him.  - Vitamin D Deficiency    8. Encounter for vitamin deficiency screening  - Vitamin D Deficiency    9. Low libido  - Testosterone, total  - TSH with free T4 reflex    Patient has been advised of split billing requirements and indicates understanding: Yes  COUNSELING:   Reviewed preventive health counseling, as reflected in patient instructions       Regular exercise       Healthy diet/nutrition    Estimated body mass index is 28.46 kg/m  as calculated from the following:    Height as of this encounter: 1.689 m (5' 6.5\").    Weight as of this encounter: 81.2 kg (179 lb).     Weight management plan: Discussed healthy diet and exercise guidelines    He reports that he has never smoked. He has never used smokeless tobacco.      Counseling Resources:  ATP IV Guidelines  Pooled Cohorts Equation Calculator  FRAX Risk Assessment  ICSI Preventive Guidelines  Dietary Guidelines for Americans, 2010  USDA's MyPlate  ASA Prophylaxis  Lung CA Screening    Felix Daley DO  [facility] [place]  3  "

## 2021-03-02 NOTE — PATIENT INSTRUCTIONS
Patient Education     Managing High Blood Pressure with the DASH Diet  What you eat can help lower your blood pressure and reduce your risk for stroke and heart disease.  One such diet, the Dietary Approaches to Stop Hypertension (DASH) diet, has been shown to reduce blood pressure. This diet is low in saturated fat, cholesterol, and total fat. The diet emphasizes fruits, vegetables, and low-fat dairy products.  Your blood pressure can be unhealthy even if it stays only slightly above 120/80 mm Hg. The higher above that level, the greater your health risk. Over time, high blood pressure makes your heart work too hard. This can cause stroke, heart disease, heart failure, kidney disease, and even blindness.  Blood pressure measurements are given as 2 numbers. Systolic blood pressure is the upper number. This is the pressure when the heart contracts. Diastolic blood pressure is the lower number. This is the pressure when the heart relaxes between beats. Blood pressure is categorized as normal, elevated, or stage 1 or stage 2 high blood pressure:    Normal blood pressure is systolic of less than 120 and diastolic of less than 80 (120/80)    Elevated blood pressure is systolic of 120 to 129 and diastolic less than 80    Stage 1 high blood pressure is systolic is 130 to 139 or diastolic between 80 to 89    Stage 2 high blood pressure is when systolic is 140 or higher or the diastolic is 90 or higher  High blood pressure is diagnosed when multiple, separate readings show blood pressure above 130/80 mmHg. Talk with your healthcare provider if you have questions or concerns about your blood pressure readings.  Why this diet works  Why is the DASH diet so effective at reducing blood pressure? It combines many nutrients that have been shown to help reduce blood pressure. Those nutrients include calcium, potassium, magnesium, protein, and fiber, as well as lower total fat and saturated fat.  The DASH diet is naturally low in  salt. The DASH diet recommends menus containing 2,300 mg of sodium. The lower sodium DASH diet contains up to 1,500 mg of sodium a day. (One teaspoon of salt contains 2,300 mg of sodium.)   Further, following the DASH diet may delay your need to take blood pressure lowering medicine, and may even keep you from needing to take it at all. And if you're already on medicine, it may help you reduce the amount you take.  Doing the DASH  The DASH diet is a 2,000-calorie diet that includes:    Six to 8 daily servings of grains and grain products, such as whole-wheat bread, cereal, oatmeal, crackers, unsalted pretzels, and popcorn. A serving size is 1 slice of bread, 1 cup of ready-to-eat cereal, or a half-cup of rice, pasta, or cereal.    Four to 5 daily servings of vegetables, the darker in color, the better. A serving size is 1 cup of raw leafy vegetables, a half-cup of cooked vegetables, or 6 ounces of vegetable juice.    Four to 5 daily servings of fruit. A serving is 1 medium fruit, quarter-cup of dried fruit, half-cup of fresh, frozen or canned fruit, or 6 ounces of fruit juice.    Two or 3 daily servings of low-fat or fat-free dairy products. A serving is 8 ounces of milk, 1 cup of yogurt, or 1  ounces of cheese.    Six or fewer daily servings of lean meat, poultry, or fish. A serving is 1 ounce of cooked meats, skinless poultry, or fish.    Four to 5 servings per week of nuts, seeds, and dry beans. A serving is one-third cup or 1  ounces of nuts, 1 tablespoon or half-ounce of seeds, or half-cup cooked dried beans.    Two to 3 small daily servings of fats and oils like olive oil and low-fat salad dressing. A serving is 1 teaspoon soft margarine, 1 tablespoon low-fat mayonnaise, 2 tablespoons light salad dressing, or 1 teaspoon vegetable oil. Don't have fats that are saturated (animal fats) or trans fats.    Five or fewer servings per week of sweets like maple syrup, sorbet, or gelatin. A serving is 1 tablespoon  "sugar, 1 tablespoon jelly or jam, half-ounce jellybeans, or 8 ounces of lemonade.  Although the DASH diet isn't designed for weight loss, it can promote it if you reduce the number of servings you eat. Most of the food the diet features is big on volume and low in calories.  Still, there are parts of the DASH diet that may not be easy to follow. For one, it's packed with dark-colored fruits and vegetables, so be prepared to be choosier at the supermarket. Also, if it's very different from what you normally eat, it may be hard to adjust.  If you're serious about following the diet, it's a good idea to work with a registered dietitian (RD) for support and guidance. (For the names of RDs in your area who know about the DASH diet, visit the Academy of Nutrition and Dietetics.)  Moving forward  If you decide to go it alone, adopt the DASH diet gradually. By doing so, you'll be more likely to stick to it long-term. For example, add 1 more serving of vegetables at lunch and dinner if you eat only 1 or 2 servings a day now. You might also add fruit to meals and snacks if you now only have juice for breakfast. In addition, slowly increase your dairy products to 3 servings per day. Try drinking skim milk with lunch or dinner, instead of soda, alcohol, or tea.  To get the most out of the DASH, lose weight if you need to and exercise regularly. Thirty to 40 minutes of moderate to vigorous intensity aerobic exercise 4 to 5 days a week is recommended.   More dish on the DASH  \"The DASH Eating Plan\" is a 24-page online guide published by the NHL[employer]. It offers a reader-friendly explanation of high blood pressure, detailed daily servings charts to help you plan your menus, a week of suggested DASH menus, plus tips to reduce sodium.  For more information about diet and other lifestyle factors to reduce hypertension, visit Your Guide to Lowering Blood Pressure.    7842-1624 [employer]. [address] [place] [address], [facility] [place] " [address]. All rights reserved. This information is not intended as a substitute for professional medical care. Always follow your healthcare professional's instructions.

## 2021-03-03 LAB
ALBUMIN SERPL-MCNC: 4.4 G/DL (ref 3.4–5)
ALP SERPL-CCNC: 85 U/L (ref 40–150)
ALT SERPL W P-5'-P-CCNC: 33 U/L (ref 0–70)
ANION GAP SERPL CALCULATED.3IONS-SCNC: 7 MMOL/L (ref 3–14)
AST SERPL W P-5'-P-CCNC: 21 U/L (ref 0–45)
BILIRUB SERPL-MCNC: 0.3 MG/DL (ref 0.2–1.3)
BUN SERPL-MCNC: 14 MG/DL (ref 7–30)
CALCIUM SERPL-MCNC: 9.9 MG/DL (ref 8.5–10.1)
CHLORIDE SERPL-SCNC: 104 MMOL/L (ref 94–109)
CHOLEST SERPL-MCNC: 265 MG/DL
CK SERPL-CCNC: 365 U/L (ref 30–300)
CO2 SERPL-SCNC: 26 MMOL/L (ref 20–32)
CREAT SERPL-MCNC: 1.07 MG/DL (ref 0.66–1.25)
GFR SERPL CREATININE-BSD FRML MDRD: 82 ML/MIN/{1.73_M2}
GLUCOSE SERPL-MCNC: 119 MG/DL (ref 70–99)
HDLC SERPL-MCNC: 54 MG/DL
LDLC SERPL CALC-MCNC: 183 MG/DL
NONHDLC SERPL-MCNC: 211 MG/DL
POTASSIUM SERPL-SCNC: 3.7 MMOL/L (ref 3.4–5.3)
PROT SERPL-MCNC: 8.4 G/DL (ref 6.8–8.8)
SODIUM SERPL-SCNC: 137 MMOL/L (ref 133–144)
TRIGL SERPL-MCNC: 138 MG/DL
TSH SERPL DL<=0.005 MIU/L-ACNC: 2.98 MU/L (ref 0.4–4)

## 2021-03-03 ASSESSMENT — ANXIETY QUESTIONNAIRES: GAD7 TOTAL SCORE: 7

## 2021-03-04 LAB — TESTOST SERPL-MCNC: 172 NG/DL (ref 240–950)

## 2021-03-07 DIAGNOSIS — R79.89 LOW TESTOSTERONE IN MALE: Primary | ICD-10-CM

## 2021-03-07 DIAGNOSIS — E78.5 HYPERLIPIDEMIA LDL GOAL <100: ICD-10-CM

## 2021-03-07 DIAGNOSIS — R74.8 ELEVATED CK: ICD-10-CM

## 2021-03-20 DIAGNOSIS — I10 ESSENTIAL HYPERTENSION WITH GOAL BLOOD PRESSURE LESS THAN 130/80: ICD-10-CM

## 2021-03-24 RX ORDER — AMLODIPINE BESYLATE 5 MG/1
TABLET ORAL
Qty: 30 TABLET | Refills: 0 | Status: SHIPPED | OUTPATIENT
Start: 2021-03-24 | End: 2021-04-06

## 2021-03-24 NOTE — TELEPHONE ENCOUNTER
BP Readings from Last 3 Encounters:   03/02/21 (!) 164/96   03/17/20 130/78   03/04/20 (!) 166/104     Next 5 appointments (look out 90 days)    Apr 06, 2021  9:30 AM  Office Visit with Felix Daley DO  [facility] ([facility] ) [address] [place] [address] [phone]        Medication is being filled for 1 time refill only due to:  Patient needs to be seen because due for BP Recheck.      Olya Noguera RN  [facility]

## 2021-04-06 ENCOUNTER — OFFICE VISIT (OUTPATIENT)
Dept: FAMILY MEDICINE | Facility: CLINIC | Age: 51
End: 2021-04-06
Payer: COMMERCIAL

## 2021-04-06 VITALS
WEIGHT: 174 LBS | HEART RATE: 84 BPM | SYSTOLIC BLOOD PRESSURE: 130 MMHG | DIASTOLIC BLOOD PRESSURE: 76 MMHG | HEIGHT: 67 IN | BODY MASS INDEX: 27.31 KG/M2 | TEMPERATURE: 98.2 F | RESPIRATION RATE: 12 BRPM

## 2021-04-06 DIAGNOSIS — R79.89 LOW TESTOSTERONE IN MALE: ICD-10-CM

## 2021-04-06 DIAGNOSIS — R45.86 MOOD CHANGES: Primary | ICD-10-CM

## 2021-04-06 DIAGNOSIS — E78.5 HYPERLIPIDEMIA LDL GOAL <100: ICD-10-CM

## 2021-04-06 DIAGNOSIS — I10 ESSENTIAL HYPERTENSION WITH GOAL BLOOD PRESSURE LESS THAN 130/80: ICD-10-CM

## 2021-04-06 LAB
FSH SERPL-ACNC: 28.1 IU/L (ref 0.7–10.8)
LH SERPL-ACNC: 7.7 IU/L (ref 1.5–9.3)

## 2021-04-06 PROCEDURE — 83002 ASSAY OF GONADOTROPIN (LH): CPT | Performed by: FAMILY MEDICINE

## 2021-04-06 PROCEDURE — 83001 ASSAY OF GONADOTROPIN (FSH): CPT | Performed by: FAMILY MEDICINE

## 2021-04-06 PROCEDURE — 84403 ASSAY OF TOTAL TESTOSTERONE: CPT | Mod: 90 | Performed by: FAMILY MEDICINE

## 2021-04-06 PROCEDURE — 99214 OFFICE O/P EST MOD 30 MIN: CPT | Performed by: FAMILY MEDICINE

## 2021-04-06 PROCEDURE — 36415 COLL VENOUS BLD VENIPUNCTURE: CPT | Performed by: FAMILY MEDICINE

## 2021-04-06 PROCEDURE — 99000 SPECIMEN HANDLING OFFICE-LAB: CPT | Performed by: FAMILY MEDICINE

## 2021-04-06 RX ORDER — AMLODIPINE BESYLATE 5 MG/1
5 TABLET ORAL DAILY
Qty: 90 TABLET | Refills: 1 | Status: SHIPPED | OUTPATIENT
Start: 2021-04-06 | End: 2021-07-20

## 2021-04-06 RX ORDER — ROSUVASTATIN CALCIUM 20 MG/1
20 TABLET, COATED ORAL EVERY OTHER DAY
Qty: 90 TABLET | Refills: 0 | COMMUNITY
Start: 2021-04-06 | End: 2021-07-23

## 2021-04-06 RX ORDER — CX-024414 0.2 MG/ML
INJECTION, SUSPENSION INTRAMUSCULAR
COMMUNITY
Start: 2020-12-30 | End: 2021-04-06

## 2021-04-06 ASSESSMENT — ANXIETY QUESTIONNAIRES
GAD7 TOTAL SCORE: 5
2. NOT BEING ABLE TO STOP OR CONTROL WORRYING: SEVERAL DAYS
IF YOU CHECKED OFF ANY PROBLEMS ON THIS QUESTIONNAIRE, HOW DIFFICULT HAVE THESE PROBLEMS MADE IT FOR YOU TO DO YOUR WORK, TAKE CARE OF THINGS AT HOME, OR GET ALONG WITH OTHER PEOPLE: SOMEWHAT DIFFICULT
7. FEELING AFRAID AS IF SOMETHING AWFUL MIGHT HAPPEN: NOT AT ALL
1. FEELING NERVOUS, ANXIOUS, OR ON EDGE: SEVERAL DAYS
6. BECOMING EASILY ANNOYED OR IRRITABLE: SEVERAL DAYS
3. WORRYING TOO MUCH ABOUT DIFFERENT THINGS: SEVERAL DAYS
5. BEING SO RESTLESS THAT IT IS HARD TO SIT STILL: NOT AT ALL

## 2021-04-06 ASSESSMENT — PAIN SCALES - GENERAL: PAINLEVEL: NO PAIN (0)

## 2021-04-06 ASSESSMENT — PATIENT HEALTH QUESTIONNAIRE - PHQ9
5. POOR APPETITE OR OVEREATING: SEVERAL DAYS
SUM OF ALL RESPONSES TO PHQ QUESTIONS 1-9: 9

## 2021-04-06 ASSESSMENT — MIFFLIN-ST. JEOR: SCORE: 1609.95

## 2021-04-06 NOTE — PROGRESS NOTES
Assessment & Plan     Low testosterone in male: recheck testosterone levels along with LH and FSH. Will follow up on results.  - Lutropin  - Follicle stimulating hormone  - Testosterone, total    Hyperlipidemia LDL goal <100: statin may have been contributing to muscle aches. Taking Crestor every other day. Will recheck CK and lipid panel in 2 monthx  - rosuvastatin (CRESTOR) 20 MG tablet; Take 1 tablet (20 mg) by mouth every other day    Essential hypertension with goal blood pressure less than 130/80: BP stable, continue current regimen.   - amLODIPine (NORVASC) 5 MG tablet; Take 1 tablet (5 mg) by mouth daily    Mood changes: improving since we last talked. Continue regular exercise, healthy diet. Will continue to monitor.      Return in about 6 months (around 10/6/2021) for BP Recheck.    Felix Daley, [facility] CYRIL Hernandez is a 50 year old who presents for the following health issues     HPI     Hypertension Follow-up      Do you check your blood pressure regularly outside of the clinic? Yes - he is checking his blood pressure at least twice per week. Typically seeing blood pressure in 130/80s.     Are you following a low salt diet? Yes    Are your blood pressures ever more than 140 on the top number (systolic) OR more   than 90 on the bottom number (diastolic), for example 140/90? Yes  BP Readings from Last 6 Encounters:   04/06/21 130/76   03/02/21 (!) 164/96   03/17/20 130/78   03/04/20 (!) 166/104   03/03/20 (!) 162/94   02/25/20 (!) 190/99     Denies any headaches, lightheadedness, dizziness, vision changes, chest pain, palpitations, shortness of breath, dyspnea, numbness/tingling. No lower extremity edema.     Anxiety Follow-Up    How are you doing with your anxiety since your last visit? No change    Have you had a significant life event? No     Are you feeling depressed? No    Do you have any concerns with your use of alcohol or other drugs? No   Overall,  "feels like his mood has been improving. He is exercising regularly which helps.         He is taking rosuvastatin 10 mg daily but difficult to cut tablet in half. Will start taking every other day.     Social History     Tobacco Use     Smoking status: Never Smoker     Smokeless tobacco: Never Used   Substance Use Topics     Alcohol use: Yes     Comment: rarely     Drug use: Never     SHERMAN-7 SCORE 3/2/2021 4/6/2021   Total Score 7 5     PHQ 3/2/2021 4/6/2021   PHQ-9 Total Score 15 9   Q9: Thoughts of better off dead/self-harm past 2 weeks Several days Not at all         Review of Systems   Constitutional, HEENT, cardiovascular, pulmonary, gi and gu systems are negative, except as otherwise noted.      Objective    /76   Pulse 84   Temp 98.2  F (36.8  C) (Tympanic)   Resp 12   Ht 1.689 m (5' 6.5\")   Wt 78.9 kg (174 lb)   BMI 27.66 kg/m    Body mass index is 27.66 kg/m .  Physical Exam   GENERAL: healthy, alert and no distress  RESP: lungs clear to auscultation - no rales, rhonchi or wheezes  CV: regular rate and rhythm, normal S1 S2, no S3 or S4, no murmur, click or rub, no peripheral edema and peripheral pulses strong  MS: no gross musculoskeletal defects noted, no edema  PSYCH: mentation appears normal, affect normal/bright          "

## 2021-04-07 ASSESSMENT — ANXIETY QUESTIONNAIRES: GAD7 TOTAL SCORE: 5

## 2021-04-08 DIAGNOSIS — R79.89 LOW TESTOSTERONE IN MALE: Primary | ICD-10-CM

## 2021-04-08 LAB — TESTOST SERPL-MCNC: 180 NG/DL (ref 240–950)

## 2021-07-01 ENCOUNTER — VIRTUAL VISIT (OUTPATIENT)
Dept: FAMILY MEDICINE | Facility: CLINIC | Age: 51
End: 2021-07-01
Payer: COMMERCIAL

## 2021-07-01 DIAGNOSIS — Z78.9 HISTORY OF FOREIGN TRAVEL: ICD-10-CM

## 2021-07-01 DIAGNOSIS — R50.9 FEVER AND CHILLS: Primary | ICD-10-CM

## 2021-07-01 LAB
BASOPHILS # BLD AUTO: 0 10E9/L (ref 0–0.2)
BASOPHILS NFR BLD AUTO: 0.2 %
DIFFERENTIAL METHOD BLD: ABNORMAL
EOSINOPHIL # BLD AUTO: 0 10E9/L (ref 0–0.7)
EOSINOPHIL NFR BLD AUTO: 0 %
ERYTHROCYTE [DISTWIDTH] IN BLOOD BY AUTOMATED COUNT: 14 % (ref 10–15)
ERYTHROCYTE [SEDIMENTATION RATE] IN BLOOD BY WESTERGREN METHOD: 40 MM/H (ref 0–15)
HCT VFR BLD AUTO: 37.7 % (ref 40–53)
HGB BLD-MCNC: 13 G/DL (ref 13.3–17.7)
LYMPHOCYTES # BLD AUTO: 0.7 10E9/L (ref 0.8–5.3)
LYMPHOCYTES NFR BLD AUTO: 11.4 %
MCH RBC QN AUTO: 26 PG (ref 26.5–33)
MCHC RBC AUTO-ENTMCNC: 34.5 G/DL (ref 31.5–36.5)
MCV RBC AUTO: 75 FL (ref 78–100)
MONOCYTES # BLD AUTO: 0.4 10E9/L (ref 0–1.3)
MONOCYTES NFR BLD AUTO: 6.6 %
NEUTROPHILS # BLD AUTO: 4.9 10E9/L (ref 1.6–8.3)
NEUTROPHILS NFR BLD AUTO: 81.8 %
PLASMODIUM AG BLD QL IA: POSITIVE
PLATELET # BLD AUTO: 55 10E9/L (ref 150–450)
RBC # BLD AUTO: 5 10E12/L (ref 4.4–5.9)
WBC # BLD AUTO: 6 10E9/L (ref 4–11)

## 2021-07-01 PROCEDURE — 99000 SPECIMEN HANDLING OFFICE-LAB: CPT | Performed by: FAMILY MEDICINE

## 2021-07-01 PROCEDURE — 86790 VIRUS ANTIBODY NOS: CPT | Mod: 90 | Performed by: FAMILY MEDICINE

## 2021-07-01 PROCEDURE — 87015 SPECIMEN INFECT AGNT CONCNTJ: CPT | Performed by: FAMILY MEDICINE

## 2021-07-01 PROCEDURE — 36415 COLL VENOUS BLD VENIPUNCTURE: CPT | Performed by: FAMILY MEDICINE

## 2021-07-01 PROCEDURE — 99214 OFFICE O/P EST MOD 30 MIN: CPT | Mod: 95 | Performed by: FAMILY MEDICINE

## 2021-07-01 PROCEDURE — 87899 AGENT NOS ASSAY W/OPTIC: CPT | Performed by: FAMILY MEDICINE

## 2021-07-01 PROCEDURE — 85652 RBC SED RATE AUTOMATED: CPT | Performed by: FAMILY MEDICINE

## 2021-07-01 PROCEDURE — 80053 COMPREHEN METABOLIC PANEL: CPT | Performed by: FAMILY MEDICINE

## 2021-07-01 PROCEDURE — 85025 COMPLETE CBC W/AUTO DIFF WBC: CPT | Performed by: FAMILY MEDICINE

## 2021-07-01 PROCEDURE — 86140 C-REACTIVE PROTEIN: CPT | Performed by: FAMILY MEDICINE

## 2021-07-01 RX ORDER — ATOVAQUONE AND PROGUANIL HYDROCHLORIDE 250; 100 MG/1; MG/1
4 TABLET, FILM COATED ORAL DAILY
Qty: 12 TABLET | Refills: 0 | Status: SHIPPED | OUTPATIENT
Start: 2021-07-01 | End: 2021-07-04

## 2021-07-01 RX ORDER — HYDROCHLOROTHIAZIDE 25 MG/1
25 TABLET ORAL DAILY
Qty: 90 TABLET | Refills: 1 | Status: CANCELLED | OUTPATIENT
Start: 2021-07-01

## 2021-07-01 RX ORDER — CIPROFLOXACIN 500 MG/1
500 TABLET, FILM COATED ORAL 2 TIMES DAILY
Status: CANCELLED | OUTPATIENT
Start: 2021-07-01

## 2021-07-01 RX ORDER — AZITHROMYCIN 250 MG/1
TABLET, FILM COATED ORAL
Qty: 16 TABLET | Refills: 0 | Status: SHIPPED | OUTPATIENT
Start: 2021-07-01 | End: 2021-07-08

## 2021-07-01 NOTE — PROGRESS NOTES
David is a 50 year old who is being evaluated via a billable video visit.      How would you like to obtain your AVS? Mail a copy  If the video visit is dropped, the invitation should be resent by: Text to cell phone: [phone]  Will anyone else be joining your video visit? No    Video Start Time: 1:17 PM    Assessment & Plan     David presents via video visit for 4 day history of fevers, chills, body aches. Unknown etiology at this time. However, he recently returned (4 days ago) from 3 week trip to [facility]. While there did not take antimalarials and did not receive any immunizations prior to travel. He is vaccinated against COVID-19. Heappears well on video but has had really high fevers per his report - 107.4. I'm not sure if these are true readings or inaccuracy of his thermometer.  Advised that if he is having fevers above 104, he needs to be seen in the ED immediately. Given travel history and lack of prophylaxis, consider malaria, Typhoid, Yellow Fever, Dengue fever, COVID-19.  Will check baseline labs and empirically treat for both malaria and Typhoid. Will check malaria rapid screen and get thick and thin slides.     He does not have any rash or hemorrhagic manifestations such as petechia, ecchymosis, purpura, or epistaxis. No neck stiffness. Discussed that he should continue pushing fluids aggressively at home, taking antipyretics. If symptoms worsen over the next 48 hours or if his blood pressure starts to decrease, he will need to be seen immediately in the ED for assessment. He is understanding.     Fever and chills  - Malaria screen rapid  - Parasite stain  - CBC with platelets and differential  - CRP, inflammation  - ESR: Erythrocyte sedimentation rate  - azithromycin (ZITHROMAX) 250 MG tablet; Take 4 tablets (1,000 mg) by mouth daily for 1 day, THEN 2 tablets (500 mg) daily for 6 days.  - atovaquone-proguanil (MALARONE) 250-100 MG tablet; Take 4 tablets by mouth daily for 3 days  - Comprehensive  metabolic panel (BMP + Alb, Alk Phos, ALT, AST, Total. Bili, TP)    History of foreign travel  - Malaria screen rapid  - Parasite stain  - CBC with platelets and differential  - CRP, inflammation  - ESR: Erythrocyte sedimentation rate  - azithromycin (ZITHROMAX) 250 MG tablet; Take 4 tablets (1,000 mg) by mouth daily for 1 day, THEN 2 tablets (500 mg) daily for 6 days.  - atovaquone-proguanil (MALARONE) 250-100 MG tablet; Take 4 tablets by mouth daily for 3 days  - Comprehensive metabolic panel (BMP + Alb, Alk Phos, ALT, AST, Total. Bili, TP)      Return in about 2 days (around 7/3/2021) for follow up if symptoms not improving.    Felix Daley DO  [facility] [place] CYRIL Hernandez is a 50 year old who presents for the following health issues     HPI     Acute Illness  Acute illness concerns: FEVER  Onset/Duration: X3 DAYS  Symptoms:  Fever: .4  Chills/Sweats: YES  Headache (location?): YES  Sinus Pressure: no  Conjunctivitis:  no  Ear Pain: no  Rhinorrhea: no  Congestion: no  Sore Throat: no  Cough: no  Wheeze: no  Decreased Appetite: YES  Nausea: YES  Vomiting: no  Diarrhea: no, but soft stools. No hematochezia or melena  Dysuria/Freq.: no  Dysuria or Hematuria: no  Fatigue/Achiness: YES  Sick/Strep Exposure: no  Therapies tried and outcome: TYLENOL    Patient states he has not taken his hydrochlorothiazide since May. Checked vitals at home -- /72  Pulse 96    David recently travelled to [facility] for 3 weeks. [place]. Denies any known ill contacts but knows he was bit by mosquitos. He returned to [place] on Sunday, 6/27 and developed a fever on Monday, 6/27. He has been having fevers, chills, body aches, decreased appetite, nausea. He denies any bruising, rash, or bleeding of any kind.       Review of Systems   Constitutional, HEENT, cardiovascular, pulmonary, gi and gu systems are negative, except as otherwise noted.      Objective            Vitals:  No vitals were obtained today due to virtual visit.    Physical Exam   GENERAL: Healthy, alert and no distress  EYES: Eyes grossly normal to inspection.  No discharge or erythema, or obvious scleral/conjunctival abnormalities.  RESP: No audible wheeze, cough, or visible cyanosis.  No visible retractions or increased work of breathing.    SKIN: Visible skin clear. No significant rash, abnormal pigmentation or lesions.  NEURO: Cranial nerves grossly intact.  Mentation and speech appropriate for age.  PSYCH: Mentation appears normal, affect normal/bright, judgement and insight intact, normal speech and appearance well-groomed.  He is able to touch his chin to his chest with minimal discomfort.          Video-Visit Details    Type of service:  Video Visit    Video End Time:1:30 PM    Originating Location (pt. Location): Home    Distant Location (provider location):  [facility]     Platform used for Video Visit: [employer]

## 2021-07-02 LAB
ALBUMIN SERPL-MCNC: 3.3 G/DL (ref 3.4–5)
ALP SERPL-CCNC: 77 U/L (ref 40–150)
ALT SERPL W P-5'-P-CCNC: 43 U/L (ref 0–70)
ANION GAP SERPL CALCULATED.3IONS-SCNC: 9 MMOL/L (ref 3–14)
AST SERPL W P-5'-P-CCNC: 37 U/L (ref 0–45)
BILIRUB SERPL-MCNC: 0.7 MG/DL (ref 0.2–1.3)
BUN SERPL-MCNC: 23 MG/DL (ref 7–30)
CALCIUM SERPL-MCNC: 9 MG/DL (ref 8.5–10.1)
CHLORIDE SERPL-SCNC: 99 MMOL/L (ref 94–109)
CO2 SERPL-SCNC: 23 MMOL/L (ref 20–32)
CREAT SERPL-MCNC: 1.37 MG/DL (ref 0.66–1.25)
CRP SERPL-MCNC: 220 MG/L (ref 0–8)
GFR SERPL CREATININE-BSD FRML MDRD: 60 ML/MIN/{1.73_M2}
GLUCOSE SERPL-MCNC: 238 MG/DL (ref 70–99)
Lab: ABNORMAL
PARASITE SPEC INSPECT: ABNORMAL
POTASSIUM SERPL-SCNC: 3.6 MMOL/L (ref 3.4–5.3)
PROT SERPL-MCNC: 7.5 G/DL (ref 6.8–8.8)
SODIUM SERPL-SCNC: 131 MMOL/L (ref 133–144)
SPECIMEN SOURCE: ABNORMAL

## 2021-07-05 LAB
DENV IGG SER IA-ACNC: 1.11 IV
DENV IGM SER IA-ACNC: 1.02 IV

## 2021-07-20 ENCOUNTER — OFFICE VISIT (OUTPATIENT)
Dept: FAMILY MEDICINE | Facility: CLINIC | Age: 51
End: 2021-07-20
Payer: COMMERCIAL

## 2021-07-20 VITALS
HEIGHT: 67 IN | BODY MASS INDEX: 26.68 KG/M2 | SYSTOLIC BLOOD PRESSURE: 134 MMHG | HEART RATE: 77 BPM | OXYGEN SATURATION: 100 % | WEIGHT: 170 LBS | TEMPERATURE: 97.6 F | DIASTOLIC BLOOD PRESSURE: 82 MMHG | RESPIRATION RATE: 14 BRPM

## 2021-07-20 DIAGNOSIS — I10 ESSENTIAL HYPERTENSION WITH GOAL BLOOD PRESSURE LESS THAN 130/80: ICD-10-CM

## 2021-07-20 DIAGNOSIS — R74.8 ELEVATED CK: ICD-10-CM

## 2021-07-20 DIAGNOSIS — R73.03 PREDIABETES: ICD-10-CM

## 2021-07-20 DIAGNOSIS — E78.5 HYPERLIPIDEMIA LDL GOAL <100: ICD-10-CM

## 2021-07-20 DIAGNOSIS — R35.0 URINARY FREQUENCY: Primary | ICD-10-CM

## 2021-07-20 LAB — HBA1C MFR BLD: 6.3 % (ref 0–5.6)

## 2021-07-20 PROCEDURE — 80053 COMPREHEN METABOLIC PANEL: CPT | Performed by: FAMILY MEDICINE

## 2021-07-20 PROCEDURE — 80061 LIPID PANEL: CPT | Performed by: FAMILY MEDICINE

## 2021-07-20 PROCEDURE — 36415 COLL VENOUS BLD VENIPUNCTURE: CPT | Performed by: FAMILY MEDICINE

## 2021-07-20 PROCEDURE — 82550 ASSAY OF CK (CPK): CPT | Performed by: FAMILY MEDICINE

## 2021-07-20 PROCEDURE — 99214 OFFICE O/P EST MOD 30 MIN: CPT | Performed by: FAMILY MEDICINE

## 2021-07-20 PROCEDURE — 83036 HEMOGLOBIN GLYCOSYLATED A1C: CPT | Performed by: FAMILY MEDICINE

## 2021-07-20 PROCEDURE — G0103 PSA SCREENING: HCPCS | Performed by: FAMILY MEDICINE

## 2021-07-20 RX ORDER — AMLODIPINE BESYLATE 5 MG/1
5 TABLET ORAL DAILY
Qty: 90 TABLET | Refills: 3 | Status: SHIPPED | OUTPATIENT
Start: 2021-07-20 | End: 2022-09-27

## 2021-07-20 ASSESSMENT — ANXIETY QUESTIONNAIRES
GAD7 TOTAL SCORE: 1
7. FEELING AFRAID AS IF SOMETHING AWFUL MIGHT HAPPEN: NOT AT ALL
1. FEELING NERVOUS, ANXIOUS, OR ON EDGE: NOT AT ALL
3. WORRYING TOO MUCH ABOUT DIFFERENT THINGS: NOT AT ALL
6. BECOMING EASILY ANNOYED OR IRRITABLE: NOT AT ALL
7. FEELING AFRAID AS IF SOMETHING AWFUL MIGHT HAPPEN: NOT AT ALL
2. NOT BEING ABLE TO STOP OR CONTROL WORRYING: NOT AT ALL
4. TROUBLE RELAXING: SEVERAL DAYS
8. IF YOU CHECKED OFF ANY PROBLEMS, HOW DIFFICULT HAVE THESE MADE IT FOR YOU TO DO YOUR WORK, TAKE CARE OF THINGS AT HOME, OR GET ALONG WITH OTHER PEOPLE?: NOT DIFFICULT AT ALL
GAD7 TOTAL SCORE: 1
GAD7 TOTAL SCORE: 1
5. BEING SO RESTLESS THAT IT IS HARD TO SIT STILL: NOT AT ALL

## 2021-07-20 ASSESSMENT — PATIENT HEALTH QUESTIONNAIRE - PHQ9
SUM OF ALL RESPONSES TO PHQ QUESTIONS 1-9: 6
SUM OF ALL RESPONSES TO PHQ QUESTIONS 1-9: 6
10. IF YOU CHECKED OFF ANY PROBLEMS, HOW DIFFICULT HAVE THESE PROBLEMS MADE IT FOR YOU TO DO YOUR WORK, TAKE CARE OF THINGS AT HOME, OR GET ALONG WITH OTHER PEOPLE: NOT DIFFICULT AT ALL

## 2021-07-20 ASSESSMENT — MIFFLIN-ST. JEOR: SCORE: 1591.8

## 2021-07-20 NOTE — PATIENT INSTRUCTIONS
Please schedule visit with endocrinology:  Your provider has referred you to: [facility]: [facility] [place] [phone]   [email or web address]

## 2021-07-20 NOTE — PROGRESS NOTES
Assessment & Plan     Essential hypertension with goal blood pressure less than 130/80: BP stable. Continue amlodipine 5 mg daily. Could increase dose to 10 mg if needed. Continue low sodium diet, regular exercise.  - amLODIPine (NORVASC) 5 MG tablet; Take 1 tablet (5 mg) by mouth daily    Hyperlipidemia LDL goal <100: at last visit, LDL still not at goal but having muscle aches and elevated CK. Since taking rosuvastatin every other day, muscle aches have resolved. Will recheck lipids and CK.  - CK total  - Lipid panel reflex to direct LDL Fasting    Elevated CK  - CK total  - Lipid panel reflex to direct LDL Fasting    Prediabetes  - Hemoglobin A1c; Future  - Comprehensive metabolic panel (BMP + Alb, Alk Phos, ALT, AST, Total. Bili, TP); Future  - Hemoglobin A1c  - Comprehensive metabolic panel (BMP + Alb, Alk Phos, ALT, AST, Total. Bili, TP)    Urinary frequency  - PSA, screen; Future  - PSA, screen    Low testosterone: information given for endocrinology consultation.      Return in about 6 months (around 1/20/2022) for BP Recheck.    Felix Daley [facility]   David is a 50 year old who presents for the following health issues     History of Present Illness       Hypertension: He presents for follow up of hypertension.  He does check blood pressure  regularly outside of the clinic. Outpatient blood pressures have not been over 140/90. He follows a low salt diet.     He eats 0-1 servings of fruits and vegetables daily.He consumes 1 sweetened beverage(s) daily.He exercises with enough effort to increase his heart rate 30 to 60 minutes per day.  He exercises with enough effort to increase his heart rate 3 or less days per week.   He is taking medications regularly.     BP Readings from Last 6 Encounters:   07/20/21 134/82   04/06/21 130/76   03/02/21 (!) 164/96   03/17/20 130/78   03/04/20 (!) 166/104   03/03/20 (!) 162/94     Denies any headaches, lightheadedness,  "dizziness, vision changes, chest pain, palpitations, shortness of breath, dyspnea, numbness/tingling. He is still feeling quite fatigue. Doesn't sleep well. Wakes up after 2-3 hours and can't go back to sleep. Usually goes to bed at 1-1:30 AM. Usually wakes up at 4 o'clock and will relax until 7 AM. He does snore. His wife once noticed that his breathing pattern was different but only that time. Doesn't wake up feeling short of breath or gasping.     He has had low testosterone but hasn't followed up with endocrinology.    Review of Systems   Constitutional, HEENT, cardiovascular, pulmonary, gi and gu systems are negative, except as otherwise noted.      Objective    /82   Pulse 77   Temp 97.6  F (36.4  C) (Tympanic)   Resp 14   Ht 1.689 m (5' 6.5\")   Wt 77.1 kg (170 lb)   SpO2 100%   BMI 27.03 kg/m    Body mass index is 27.03 kg/m .  Physical Exam   GENERAL: healthy, alert and no distress  RESP: lungs clear to auscultation - no rales, rhonchi or wheezes  CV: regular rate and rhythm, normal S1 S2, no S3 or S4, no murmur, click or rub, no peripheral edema and peripheral pulses strong  MS: no gross musculoskeletal defects noted, no edema  PSYCH: mentation appears normal, affect normal/bright              "

## 2021-07-21 LAB
ALBUMIN SERPL-MCNC: 3.9 G/DL (ref 3.4–5)
ALP SERPL-CCNC: 95 U/L (ref 40–150)
ALT SERPL W P-5'-P-CCNC: 34 U/L (ref 0–70)
ANION GAP SERPL CALCULATED.3IONS-SCNC: 8 MMOL/L (ref 3–14)
AST SERPL W P-5'-P-CCNC: 22 U/L (ref 0–45)
BILIRUB SERPL-MCNC: 0.4 MG/DL (ref 0.2–1.3)
BUN SERPL-MCNC: 11 MG/DL (ref 7–30)
CALCIUM SERPL-MCNC: 9.4 MG/DL (ref 8.5–10.1)
CHLORIDE BLD-SCNC: 103 MMOL/L (ref 94–109)
CHOLEST SERPL-MCNC: 306 MG/DL
CK SERPL-CCNC: 190 U/L (ref 30–300)
CO2 SERPL-SCNC: 26 MMOL/L (ref 20–32)
CREAT SERPL-MCNC: 1.2 MG/DL (ref 0.66–1.25)
FASTING STATUS PATIENT QL REPORTED: YES
GFR SERPL CREATININE-BSD FRML MDRD: 71 ML/MIN/1.73M2
GLUCOSE BLD-MCNC: 118 MG/DL (ref 70–99)
HDLC SERPL-MCNC: 49 MG/DL
LDLC SERPL CALC-MCNC: 233 MG/DL
NONHDLC SERPL-MCNC: 257 MG/DL
POTASSIUM BLD-SCNC: 3.5 MMOL/L (ref 3.4–5.3)
PROT SERPL-MCNC: 8.1 G/DL (ref 6.8–8.8)
PSA SERPL-MCNC: 0.67 UG/L (ref 0–4)
SODIUM SERPL-SCNC: 137 MMOL/L (ref 133–144)
TRIGL SERPL-MCNC: 122 MG/DL

## 2021-07-21 ASSESSMENT — ANXIETY QUESTIONNAIRES: GAD7 TOTAL SCORE: 1

## 2021-07-22 DIAGNOSIS — E78.5 HYPERLIPIDEMIA LDL GOAL <100: ICD-10-CM

## 2021-07-22 DIAGNOSIS — E78.5 HYPERLIPIDEMIA LDL GOAL <100: Primary | ICD-10-CM

## 2021-07-23 RX ORDER — ROSUVASTATIN CALCIUM 20 MG/1
TABLET, COATED ORAL
Qty: 90 TABLET | Refills: 0 | Status: SHIPPED | OUTPATIENT
Start: 2021-07-23 | End: 2021-10-15

## 2021-07-23 NOTE — TELEPHONE ENCOUNTER
Routing refill request to provider for review/approval because:  Medication is reported/historical  Akhil Kraft RN, BSN

## 2021-10-10 ENCOUNTER — HEALTH MAINTENANCE LETTER (OUTPATIENT)
Age: 51
End: 2021-10-10

## 2021-10-14 DIAGNOSIS — E78.5 HYPERLIPIDEMIA LDL GOAL <100: ICD-10-CM

## 2021-10-15 RX ORDER — ROSUVASTATIN CALCIUM 20 MG/1
TABLET, COATED ORAL
Qty: 90 TABLET | Refills: 2 | Status: SHIPPED | OUTPATIENT
Start: 2021-10-15 | End: 2022-06-20

## 2021-10-26 ENCOUNTER — VIRTUAL VISIT (OUTPATIENT)
Dept: ENDOCRINOLOGY | Facility: CLINIC | Age: 51
End: 2021-10-26
Attending: FAMILY MEDICINE
Payer: COMMERCIAL

## 2021-10-26 ENCOUNTER — LAB (OUTPATIENT)
Dept: LAB | Facility: CLINIC | Age: 51
End: 2021-10-26
Payer: COMMERCIAL

## 2021-10-26 DIAGNOSIS — Q53.9 UNDESCENDED TESTICLE, UNSPECIFIED LATERALITY, UNSPECIFIED LOCATION: ICD-10-CM

## 2021-10-26 DIAGNOSIS — E29.1 MALE HYPOGONADISM: Primary | ICD-10-CM

## 2021-10-26 DIAGNOSIS — E29.1 MALE HYPOGONADISM: ICD-10-CM

## 2021-10-26 LAB
FERRITIN SERPL-MCNC: 65 NG/ML (ref 26–388)
PROLACTIN SERPL-MCNC: 7 UG/L (ref 2–18)

## 2021-10-26 PROCEDURE — 82728 ASSAY OF FERRITIN: CPT

## 2021-10-26 PROCEDURE — 36415 COLL VENOUS BLD VENIPUNCTURE: CPT

## 2021-10-26 PROCEDURE — 84403 ASSAY OF TOTAL TESTOSTERONE: CPT

## 2021-10-26 PROCEDURE — 99204 OFFICE O/P NEW MOD 45 MIN: CPT | Mod: 95 | Performed by: INTERNAL MEDICINE

## 2021-10-26 PROCEDURE — 84146 ASSAY OF PROLACTIN: CPT

## 2021-10-26 NOTE — PROGRESS NOTES
"Patient is being evaluated via a billable video visit.      How would you like to obtain your AVS? Reviewed verbally  If the video visit is dropped, the invitation should be resent by cell phone  Will anyone else be joining your video visit? no      Video Start Time:  2:05 pm    Video-Visit Details    Type of service:  Video Visit    Video End Time:  2:25 pm    Originating Location (pt. Location): home    Distant Location (provider location):  [facility]    Platform used for Video Visit:  [employer]      Name: David Alaniz is a 50 year old man, seen at the request of Dr. Felix Daley for evaluation of     Chief Complaint   Patient presents with     Low testosterone in male       HPI:  Recent issues:  Here for evaluation of low testosterone problem  Reviewed medical history from patient and Epic chart record      [facility]  Early 2021. Progressive symptoms of weakness  Also noted low ambition, low libido, some erectile dysfunction, constipation, moodiness  Medication evaluation with Dr. DEEDEE Daley  Additional lab testing completed    Previous [facility] labs include:           Health history:               Testicular surgery:   Undescended right testis ~32 yoa                          Testicular injury:   none  Testosterone med use:  Testosterone \"pill\" daily for 1 month after testis surgery  Small testicles:  Both sides, per patient   Sense of smell:  normal           FamHx Hypogonadism: none known      Recent [facility] labs include:            Lab Results   Component Value Date    FSH 28.1 (H) 04/06/2021         Lives in [facility]  Sees Dr. Felix Daley/[facility] clinic for general medicine evaluations.    PMH/PSH:  Past Medical History:   Diagnosis Date     Anemia      HTN (hypertension)      Hyperlipidemia LDL goal <130      Low testosterone in male      No past surgical history on file.    Family Hx:  Family History   Problem Relation Age of Onset     Cerebrovascular Disease " Mother          Social Hx:  Social History     Socioeconomic History     Marital status:      Spouse name: Not on file     Number of children: Not on file     Years of education: Not on file     Highest education level: Not on file   Occupational History     Not on file   Tobacco Use     Smoking status: Never Smoker     Smokeless tobacco: Never Used   Vaping Use     Vaping Use: Never used   Substance and Sexual Activity     Alcohol use: Yes     Comment: rarely     Drug use: Never     Sexual activity: Yes   Other Topics Concern     Not on file   Social History Narrative     Not on file     Social Determinants of Health     Financial Resource Strain:      Difficulty of Paying Living Expenses:    Food Insecurity:      Worried About Running Out of Food in the Last Year:      Ran Out of Food in the Last Year:    Transportation Needs:      Lack of Transportation (Medical):      Lack of Transportation (Non-Medical):    Physical Activity:      Days of Exercise per Week:      Minutes of Exercise per Session:    Stress:      Feeling of Stress :    Social Connections:      Frequency of Communication with Friends and Family:      Frequency of Social Gatherings with Friends and Family:      Attends [employer] Services:      Active Member of Clubs or Organizations:      Attends Club or Organization Meetings:      Marital Status:    Intimate Partner Violence:      Fear of Current or Ex-Partner:      Emotionally Abused:      Physically Abused:      Sexually Abused:           MEDICATIONS:  has a current medication list which includes the following prescription(s): amlodipine and rosuvastatin.    ROS:     ROS: 10 point ROS neg other than the symptoms noted above in the HPI.    GENERAL:  fatigue, wt stable; denies fevers, chills, night sweats.    HEENT: no dysphagia, odonophagia, diplopia, neck pain  THYROID:  no apparent hyper or hypothyroid symptoms  CV: no chest pain, pressure, palpitations  LUNGS: no SOB, BURNS, cough,  wheezing   ABDOMEN: constipation; no diarrhea, abdominal pain  EXTREMITIES: no rashes, ulcers, edema  NEUROLOGY: no headaches, denies changes in vision, tingling, extremitiy numbness   MSK: weakness; no muscle aches or pains  SKIN: no rashes or lesions  : low libido and some ED; no dysuria, nocturia  PSYCH:  Moodiness; no significant anxiety or depression  ENDOCRINE: no heat or cold intolerance    Physical Exam (visual exam)  VS:  no vital signs taken for video visit  CONSTITUTIONAL: healthy, alert and NAD, well dressed, answering questions appropriately  ENT: no nose swelling or nasal discharge, mouth redness or gum changes.  EYES: eyes grossly normal to inspection, conjunctivae and sclerae normal, no exophthalmos or proptosis  THYROID:  no apparent nodules or goiter  LUNGS: no audible wheeze, cough or visible cyanosis, no visible retractions or increased work of breathing  ABDOMEN: abdomen not evaluated  EXTREMITIES: no hand tremors, limited exam  NEUROLOGY: CN grossly intact, mentation intact and speech normal   SKIN:  no apparent skin lesions, rash, or edema with visualized skin appearance  PSYCH: mentation appears normal, affect normal/bright, judgement and insight intact,   normal speech and appearance well groomed      LABS:    All pertinent notes, labs, and images personally reviewed by me.     A/P:  Encounter Diagnoses   Name Primary?     Male hypogonadism Yes     Undescended testicle, unspecified laterality, unspecified location        Comments:  Reviewed health history and hypogonadism issues.  Suspect low testosterone levels a chronic problem related to undescended testicle(s)  Unclear why symptoms evident past 6-12 months however    Plan:  Discussed general issues with the hypogonadism diagnosis and management  We discussed lab tests to assess testosterone axis hormone levels.  Reviewed treatment options with topical, nasal, and injectable testosterone medication use.    Recommend:  Advised repeat lab  tests for the hypogonadism workup  Check total testosterone, prolactin, ferritin   Lab orders placed  Consider treatment with testosterone medication, discussed med options   Advise treatment with topical testosterone gel with daily dosing  Monitor for symptom changes  Will summarize test results and recommendations soon  Also needs recheck testosterone levels after starting treatment  Consider urology consult if further questions about undescended testes management    Addressed patient questions today    There are no Patient Instructions on file for this visit.    Future labs ordered today:   Orders Placed This Encounter   Procedures     Testosterone total     Prolactin     Ferritin     Radiology/Consults ordered today: None    Total time spent in with the patient evaluation:  20 min  Additional time spent reviewing pertinent lab tests and chart notes, and documentation:  25 min    Follow-up:  12/2021    HEENA Moran MD, MS  Endocrinology  [facility]    CC: Felix Daley

## 2021-10-26 NOTE — LETTER
"    10/26/2021         RE: David Alaniz  [address]  [place] [address]        Dear Colleague,    Thank you for referring your patient, David Alaniz, to the [facility]. Please see a copy of my visit note below.    Patient is being evaluated via a billable video visit.      How would you like to obtain your AVS? Reviewed verbally  If the video visit is dropped, the invitation should be resent by cell phone  Will anyone else be joining your video visit? no      Video Start Time:  2:05 pm    Video-Visit Details    Type of service:  Video Visit    Video End Time:  2:25 pm    Originating Location (pt. Location): home    Distant Location (provider location):  [facility]/home    Platform used for Video Visit:  [employer]      Name: David Alaniz is a 50 year old man, seen at the request of Dr. Felix Daley for evaluation of     Chief Complaint   Patient presents with     Low testosterone in male       HPI:  Recent issues:  Here for evaluation of low testosterone problem  Reviewed medical history from patient and Epic chart record      [facility]  Early 2021. Progressive symptoms of weakness  Also noted low ambition, low libido, some erectile dysfunction, constipation, moodiness  Medication evaluation with Dr. DEEDEE Daley  Additional lab testing completed    Previous [facility] labs include:           Health history:               Testicular surgery:   Undescended right testis ~32 yoa                          Testicular injury:   none  Testosterone med use:  Testosterone \"pill\" daily for 1 month after testis surgery  Small testicles:  Both sides, per patient   Sense of smell:  normal           FamHx Hypogonadism: none known      Recent [facility]V labs include:            Lab Results   Component Value Date    FSH 28.1 (H) 04/06/2021         Lives in [facility]  Sees Dr. Felix Daley/[facility] clinic for general medicine evaluations.    PMH/PSH:  Past Medical " History:   Diagnosis Date     Anemia      HTN (hypertension)      Hyperlipidemia LDL goal <130      Low testosterone in male      No past surgical history on file.    Family Hx:  Family History   Problem Relation Age of Onset     Cerebrovascular Disease Mother          Social Hx:  Social History     Socioeconomic History     Marital status:      Spouse name: Not on file     Number of children: Not on file     Years of education: Not on file     Highest education level: Not on file   Occupational History     Not on file   Tobacco Use     Smoking status: Never Smoker     Smokeless tobacco: Never Used   Vaping Use     Vaping Use: Never used   Substance and Sexual Activity     Alcohol use: Yes     Comment: rarely     Drug use: Never     Sexual activity: Yes   Other Topics Concern     Not on file   Social History Narrative     Not on file     Social Determinants of Health     Financial Resource Strain:      Difficulty of Paying Living Expenses:    Food Insecurity:      Worried About Running Out of Food in the Last Year:      Ran Out of Food in the Last Year:    Transportation Needs:      Lack of Transportation (Medical):      Lack of Transportation (Non-Medical):    Physical Activity:      Days of Exercise per Week:      Minutes of Exercise per Session:    Stress:      Feeling of Stress :    Social Connections:      Frequency of Communication with Friends and Family:      Frequency of Social Gatherings with Friends and Family:      Attends Yarsanism Services:      Active Member of Clubs or Organizations:      Attends Club or Organization Meetings:      Marital Status:    Intimate Partner Violence:      Fear of Current or Ex-Partner:      Emotionally Abused:      Physically Abused:      Sexually Abused:           MEDICATIONS:  has a current medication list which includes the following prescription(s): amlodipine and rosuvastatin.    ROS:     ROS: 10 point ROS neg other than the symptoms noted above in the  HPI.    GENERAL:  fatigue, wt stable; denies fevers, chills, night sweats.    HEENT: no dysphagia, odonophagia, diplopia, neck pain  THYROID:  no apparent hyper or hypothyroid symptoms  CV: no chest pain, pressure, palpitations  LUNGS: no SOB, BURNS, cough, wheezing   ABDOMEN: constipation; no diarrhea, abdominal pain  EXTREMITIES: no rashes, ulcers, edema  NEUROLOGY: no headaches, denies changes in vision, tingling, extremitiy numbness   MSK: weakness; no muscle aches or pains  SKIN: no rashes or lesions  : low libido and some ED; no dysuria, nocturia  PSYCH:  Moodiness; no significant anxiety or depression  ENDOCRINE: no heat or cold intolerance    Physical Exam (visual exam)  VS:  no vital signs taken for video visit  CONSTITUTIONAL: healthy, alert and NAD, well dressed, answering questions appropriately  ENT: no nose swelling or nasal discharge, mouth redness or gum changes.  EYES: eyes grossly normal to inspection, conjunctivae and sclerae normal, no exophthalmos or proptosis  THYROID:  no apparent nodules or goiter  LUNGS: no audible wheeze, cough or visible cyanosis, no visible retractions or increased work of breathing  ABDOMEN: abdomen not evaluated  EXTREMITIES: no hand tremors, limited exam  NEUROLOGY: CN grossly intact, mentation intact and speech normal   SKIN:  no apparent skin lesions, rash, or edema with visualized skin appearance  PSYCH: mentation appears normal, affect normal/bright, judgement and insight intact,   normal speech and appearance well groomed      LABS:    All pertinent notes, labs, and images personally reviewed by me.     A/P:  Encounter Diagnoses   Name Primary?     Male hypogonadism Yes     Undescended testicle, unspecified laterality, unspecified location        Comments:  Reviewed health history and hypogonadism issues.  Suspect low testosterone levels a chronic problem related to undescended testicle(s)  Unclear why symptoms evident past 6-12 months however    Plan:  Discussed  general issues with the hypogonadism diagnosis and management  We discussed lab tests to assess testosterone axis hormone levels.  Reviewed treatment options with topical, nasal, and injectable testosterone medication use.    Recommend:  Advised repeat lab tests for the hypogonadism workup  Check total testosterone, prolactin, ferritin   Lab orders placed  Consider treatment with testosterone medication, discussed med options   Advise treatment with topical testosterone gel with daily dosing  Monitor for symptom changes  Will summarize test results and recommendations soon  Also needs recheck testosterone levels after starting treatment  Consider urology consult if further questions about undescended testes management    Addressed patient questions today    There are no Patient Instructions on file for this visit.    Future labs ordered today:   Orders Placed This Encounter   Procedures     Testosterone total     Prolactin     Ferritin     Radiology/Consults ordered today: None    Total time spent in with the patient evaluation:  20 min  Additional time spent reviewing pertinent lab tests and chart notes, and documentation:  25 min    Follow-up:  12/2021    HEENA Moran MD, MS  Endocrinology  [facility]    CC: Felix Daley         Again, thank you for allowing me to participate in the care of your patient.        Sincerely,        Johan Moran MD

## 2021-11-02 LAB — TESTOST SERPL-MCNC: 201 NG/DL (ref 240–950)

## 2021-11-19 DIAGNOSIS — E29.1 MALE HYPOGONADISM: Primary | ICD-10-CM

## 2021-11-19 RX ORDER — TESTOSTERONE GEL, 1% 10 MG/G
GEL TRANSDERMAL
Qty: 75 G | Refills: 5 | Status: SHIPPED | OUTPATIENT
Start: 2021-11-19 | End: 2022-06-17

## 2021-11-19 RX ORDER — TESTOSTERONE GEL, 1% 10 MG/G
GEL TRANSDERMAL
Qty: 75 G | Refills: 5 | Status: SHIPPED | OUTPATIENT
Start: 2021-11-19 | End: 2021-11-19

## 2021-11-24 ENCOUNTER — TELEPHONE (OUTPATIENT)
Dept: ENDOCRINOLOGY | Facility: CLINIC | Age: 51
End: 2021-11-24
Payer: COMMERCIAL

## 2021-11-30 NOTE — TELEPHONE ENCOUNTER
Prior Authorization Approval    Authorization Effective Date: 10/27/2021  Authorization Expiration Date: 11/26/2022  Medication: testosterone (ANDROGEL 1 % PUMP) 12.5 MG/ACT (1%) gel  Approved Dose/Quantity: 150g  Reference #: [id number]   Insurance Company: [employer] - Phone [phone] Fax [phone]  Which Pharmacy is filling the prescription (Not needed for infusion/clinic administered): [id number] [place] - [address] [facility]  Pharmacy Notified: Yes **Instructed pharmacy to notify patient when script is ready to /ship.**  Patient Notified: Yes kenna

## 2022-05-22 ENCOUNTER — HEALTH MAINTENANCE LETTER (OUTPATIENT)
Age: 52
End: 2022-05-22

## 2022-06-17 DIAGNOSIS — E29.1 MALE HYPOGONADISM: Primary | ICD-10-CM

## 2022-06-17 DIAGNOSIS — E29.1 MALE HYPOGONADISM: ICD-10-CM

## 2022-06-17 RX ORDER — TESTOSTERONE GEL, 1% 10 MG/G
GEL TRANSDERMAL
Qty: 75 G | Refills: 1 | Status: SHIPPED | OUTPATIENT
Start: 2022-06-17

## 2022-06-17 NOTE — TELEPHONE ENCOUNTER
Message noted... I agree.  He needs repeat lab testing (orders placed) and renewal of the testosterone gel Rx (eRx sent).  Thanks for reminding patient of plan.    HEENA Moran MD, MS  Endocrinology  [facility]

## 2022-06-17 NOTE — TELEPHONE ENCOUNTER
Last OV 10/26/21  Next OV not schedule- appt needed in October/NOv.  Pt never had labs after starting testosterone and needs labs ASAP.   Please schedule labs now and a f/u with Dr Moran in Oct/Nov.

## 2022-09-18 ENCOUNTER — HEALTH MAINTENANCE LETTER (OUTPATIENT)
Age: 52
End: 2022-09-18

## 2022-09-26 DIAGNOSIS — E78.5 HYPERLIPIDEMIA LDL GOAL <100: ICD-10-CM

## 2022-09-26 DIAGNOSIS — I10 ESSENTIAL HYPERTENSION WITH GOAL BLOOD PRESSURE LESS THAN 130/80: ICD-10-CM

## 2022-09-27 NOTE — TELEPHONE ENCOUNTER
Routing refill request to provider for review/approval because:  Vibha given x1 and patient did not follow up, please advise    LOV 7/2021    Team please call to schedule yearly with provider    Jacque Zhao RN

## 2022-09-28 RX ORDER — AMLODIPINE BESYLATE 5 MG/1
TABLET ORAL
Qty: 30 TABLET | Refills: 0 | Status: SHIPPED | OUTPATIENT
Start: 2022-09-28 | End: 2022-10-22

## 2022-09-28 RX ORDER — ROSUVASTATIN CALCIUM 20 MG/1
TABLET, COATED ORAL
Qty: 30 TABLET | Refills: 0 | Status: SHIPPED | OUTPATIENT
Start: 2022-09-28 | End: 2022-10-29

## 2022-09-29 NOTE — TELEPHONE ENCOUNTER
Left non-detailed message for patient to call back.  Please schedule annual physical  when patient calls back.  (see previous notes for details)    Thanks Flor

## 2022-10-20 DIAGNOSIS — I10 ESSENTIAL HYPERTENSION WITH GOAL BLOOD PRESSURE LESS THAN 130/80: ICD-10-CM

## 2022-10-21 NOTE — TELEPHONE ENCOUNTER
Routing refill request to provider for review/approval because:  Vibha given x1 and patient did not follow up, please advise    Team please call to schedule yearly with provider     Jacque Zhao RN

## 2022-10-22 RX ORDER — AMLODIPINE BESYLATE 5 MG/1
TABLET ORAL
Qty: 30 TABLET | Refills: 0 | Status: SHIPPED | OUTPATIENT
Start: 2022-10-22 | End: 2022-11-23

## 2022-10-24 NOTE — TELEPHONE ENCOUNTER
I spoke with a person at the pt. Phone number, but the person couldn't verify his date of birth or address.  So no message was sent.       Patrick Duarte

## 2022-10-26 DIAGNOSIS — E78.5 HYPERLIPIDEMIA LDL GOAL <100: ICD-10-CM

## 2022-10-29 RX ORDER — ROSUVASTATIN CALCIUM 20 MG/1
TABLET, COATED ORAL
Qty: 30 TABLET | Refills: 0 | Status: SHIPPED | OUTPATIENT
Start: 2022-10-29

## 2022-12-25 DIAGNOSIS — I10 ESSENTIAL HYPERTENSION WITH GOAL BLOOD PRESSURE LESS THAN 130/80: ICD-10-CM

## 2022-12-28 RX ORDER — AMLODIPINE BESYLATE 5 MG/1
TABLET ORAL
Qty: 30 TABLET | Refills: 0 | Status: SHIPPED | OUTPATIENT
Start: 2022-12-28 | End: 2023-02-06

## 2022-12-28 NOTE — TELEPHONE ENCOUNTER
Routing refill request to provider for review/approval because:   Calcium Channel Blockers Protocol  Failed 12/25/2022 12:32 PM   Protocol Details  Blood pressure under 140/90 in past 12 months    Recent (12 mo) or future (30 days) visit within the authorizing provider's specialty    Normal serum creatinine on file in past 12 months          Rosita Talbert RN, BSN  [facility]

## 2023-01-25 DIAGNOSIS — I10 ESSENTIAL HYPERTENSION WITH GOAL BLOOD PRESSURE LESS THAN 130/80: ICD-10-CM

## 2023-01-25 NOTE — LETTER
[facility]           [address] [place] [id number]  [phone]  February 6, 2023    David Alaniz  [address]  [place] [address]    Dear David,    We have attempted to contact you regarding a prescription refill request we have on file.  At this time we are not able to fill this medication until we see you in the office for an annual physical, medication review and fasting labs.  Once an appointment is scheduled we can refill your medication for a 30 day supply. Please call us at [phone] to schedule an appointment or with any questions or concerns.    In addition, here is a list of due or overdue Health Maintenance reminders.    Health Maintenance Due   Topic Date Due     ANNUAL REVIEW OF  ORDERS  Never done     Hepatitis B Vaccine (1 of 3 - 3-dose series) Never done     Colorectal Cancer Screening  Never done     COVID-19 Vaccine (3 - Booster for Moderna series) 03/23/2021     Yearly Preventive Visit  03/02/2022     Flu Vaccine (1) 09/01/2022     PHQ-2 (once per calendar year)  01/01/2023     Zoster (Shingles) Vaccine (1 of 2) 12/12/2022       Best Regards,    Felix Daley DO

## 2023-01-27 NOTE — TELEPHONE ENCOUNTER
TC- Please call patient to schedule an appointment   Due for an Office visit for further refills.      LOV:7/2021    Please route  To provider after appointment has been made so potential refills can be provided.    Thank you!  Aminah ALDANA RN   [facility] Triage

## 2023-02-06 RX ORDER — AMLODIPINE BESYLATE 5 MG/1
TABLET ORAL
Qty: 30 TABLET | Refills: 0 | Status: SHIPPED | OUTPATIENT
Start: 2023-02-06 | End: 2023-02-06

## 2023-06-04 ENCOUNTER — HEALTH MAINTENANCE LETTER (OUTPATIENT)
Age: 53
End: 2023-06-04

## 2024-07-14 ENCOUNTER — HEALTH MAINTENANCE LETTER (OUTPATIENT)
Age: 54
End: 2024-07-14